# Patient Record
Sex: MALE | Race: WHITE | NOT HISPANIC OR LATINO | Employment: FULL TIME | URBAN - METROPOLITAN AREA
[De-identification: names, ages, dates, MRNs, and addresses within clinical notes are randomized per-mention and may not be internally consistent; named-entity substitution may affect disease eponyms.]

---

## 2020-06-25 ENCOUNTER — OFFICE VISIT (OUTPATIENT)
Dept: FAMILY MEDICINE | Facility: CLINIC | Age: 30
End: 2020-06-25
Payer: COMMERCIAL

## 2020-06-25 ENCOUNTER — ANCILLARY PROCEDURE (OUTPATIENT)
Dept: GENERAL RADIOLOGY | Facility: CLINIC | Age: 30
End: 2020-06-25
Attending: FAMILY MEDICINE
Payer: COMMERCIAL

## 2020-06-25 ENCOUNTER — TELEPHONE (OUTPATIENT)
Dept: FAMILY MEDICINE | Facility: CLINIC | Age: 30
End: 2020-06-25

## 2020-06-25 VITALS
HEART RATE: 73 BPM | WEIGHT: 199 LBS | SYSTOLIC BLOOD PRESSURE: 129 MMHG | DIASTOLIC BLOOD PRESSURE: 83 MMHG | HEIGHT: 70 IN | BODY MASS INDEX: 28.49 KG/M2 | TEMPERATURE: 98 F | OXYGEN SATURATION: 99 %

## 2020-06-25 DIAGNOSIS — J32.0 CHRONIC MAXILLARY SINUSITIS: ICD-10-CM

## 2020-06-25 DIAGNOSIS — J32.0 CHRONIC MAXILLARY SINUSITIS: Primary | ICD-10-CM

## 2020-06-25 DIAGNOSIS — G44.039 NONINTRACTABLE PAROXYSMAL HEMICRANIA, UNSPECIFIED CHRONICITY PATTERN: ICD-10-CM

## 2020-06-25 PROCEDURE — 70220 X-RAY EXAM OF SINUSES: CPT

## 2020-06-25 PROCEDURE — 99204 OFFICE O/P NEW MOD 45 MIN: CPT | Performed by: FAMILY MEDICINE

## 2020-06-25 RX ORDER — FINASTERIDE 5 MG/1
5 TABLET, FILM COATED ORAL
COMMUNITY
Start: 2019-10-28 | End: 2021-03-02

## 2020-06-25 ASSESSMENT — MIFFLIN-ST. JEOR: SCORE: 1876.4

## 2020-06-25 ASSESSMENT — PAIN SCALES - GENERAL: PAINLEVEL: MODERATE PAIN (4)

## 2020-06-25 NOTE — PROGRESS NOTES
"Subjective     Mikhail Sinha is a 29 year old male who presents to clinic today for the following health issues:    HPI   Acute Illness   Acute illness concerns: sinus problem  Onset: 40 days ago    Fever: no    Chills/Sweats: no    Headache (location?): YES- off and on    Sinus Pressure:YES- post-nasal drainage, facial pain and head fog, pressure feeling in the forehead area    Conjunctivitis:  no    Ear Pain: YES: both    Rhinorrhea: no    Congestion: no    Sore Throat: no     Cough: no    Wheeze: no    Decreased Appetite: YES    Nausea: YES    Vomiting: No    Diarrhea:  YES- one night loose stool    Dysuria/Freq.: no    Fatigue/Achiness: YES    Sick/Strep Exposure: no     Therapies Tried and outcome: OTC Claritin and Flonase and to helpful  Did do a virtual online visit and they gave him Augement for 10 days     He has felt head congestion for several weeks. No fever or cough. Intermittent dizziness.  No change I sense of taste or smell.     No improvement with Claritin, Flonase and augmentin    He does not have a hx of sinus infections or allergies.     Took sudafed and felt worse.           Reviewed and updated as needed this visit by Provider         Review of Systems   Constitutional, HEENT, cardiovascular, pulmonary, gi and gu systems are negative, except as otherwise noted.      Objective    /83 (BP Location: Left arm, Patient Position: Chair, Cuff Size: Adult Regular)   Pulse 73   Temp 98  F (36.7  C) (Oral)   Ht 1.79 m (5' 10.47\")   Wt 90.3 kg (199 lb)   SpO2 99%   BMI 28.17 kg/m    Body mass index is 28.17 kg/m .  Physical Exam   GENERAL: healthy, alert and no distress  EYES: Eyes grossly normal to inspection, PERRL and conjunctivae and sclerae normal  HENT: ear canals and TM's normal, nose and mouth without ulcers or lesions, oropharynx clear, oral mucous membranes moist and no sinus tenderness  NECK: no adenopathy, no asymmetry, masses, or scars and thyroid normal to " "palpation  RESP: lungs clear to auscultation - no rales, rhonchi or wheezes  CV: regular rate and rhythm, normal S1 S2, no S3 or S4, no murmur, click or rub, no peripheral edema and peripheral pulses strong  ABDOMEN: soft, nontender, no hepatosplenomegaly, no masses and bowel sounds normal  MS: no gross musculoskeletal defects noted, no edema  NEURO: Normal strength and tone, mentation intact and speech normal  PSYCH: mentation appears normal, affect normal/bright    Diagnostic Test Results:  Labs reviewed in Epic  Sinus x-ray normal        Assessment & Plan   Assessment      Plan  (J32.0) Chronic maxillary sinusitis  (primary encounter diagnosis)  Comment:   Plan: XR Sinus Complete G/E 3 Views          X-rays normal and atypical presentation for sinusitus    (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    Weight as of this encounter: 90.3 kg (199 lb).             Patient Instructions     Orders Placed This Encounter     XR Sinus Complete G/E 3 Views     Standing Status:   Future     Number of Occurrences:   1     Standing Expiration Date:   2021     Order Specific Question:   Priority     Answer:   Routine     MR Brain w/o & w Contrast     Standing Status:   Future     Standing Expiration Date:   2021     Order Specific Question:   Priority     Answer:   Routine     Order Specific Question:   Does the patient have claustrophobia?     Answer:   No     Order Specific Question:   Is this exam to be performed at [facility]?     Answer:   No     finasteride (PROSCAR) 5 MG tablet     Si mg Patient is takng 1.25 mg daily     Scheduling Appointments        [facility]. " All locations.  Call: [phone]        Return in about 1 week (around 7/2/2020) for Dr Anand will call to review results.    Xavi Anand MD, MD  [facility]

## 2020-06-25 NOTE — TELEPHONE ENCOUNTER
"Routing to Dr. Anand as an FYI.  Patient has an appointment scheduled with you today at 2:40 pm for sinus symptoms.      RN from  asked this RN to triage patient.    Patient reports he has had the following sinus symptoms x one month that come and go:  facial/sinus pain, headache, bilateral ear pain (mostly R ear), dry throat, jaw pain, and \"brain fog\".  He rates pain a 5-6/10, and has lots of pressure in his ears/sinus/head. Denies fever, cough, SOB, sore throat, ear drainage, or other symptoms.    Patient reports having a virtual visit two weeks ago at [facility] and was treated with Augmentin for a possible sinus infection. He finished course of antibiotics and symptoms continue.  This encounter is not in his chart. , address, and phone number verified with patient.     He has tried Flonase, Claritin, and Sudafed; however, medications do not seem to help.     Patient will plan on seeing Dr. Anand as scheduled at 2:40 pm.  He will ask the  if there are two different charts under his name.     Jacobo Jefferson RN          "

## 2020-06-25 NOTE — PATIENT INSTRUCTIONS
Orders Placed This Encounter     XR Sinus Complete G/E 3 Views     Standing Status:   Future     Number of Occurrences:   1     Standing Expiration Date:   2021     Order Specific Question:   Priority     Answer:   Routine     MR Brain w/o & w Contrast     Standing Status:   Future     Standing Expiration Date:   2021     Order Specific Question:   Priority     Answer:   Routine     Order Specific Question:   Does the patient have claustrophobia?     Answer:   No     Order Specific Question:   Is this exam to be performed at [facility]?     Answer:   No     finasteride (PROSCAR) 5 MG tablet     Si mg Patient is takng 1.25 mg daily     Scheduling Appointments        [facility]. All locations.  Call: [phone]

## 2020-06-29 ENCOUNTER — HOSPITAL ENCOUNTER (OUTPATIENT)
Dept: MRI IMAGING | Facility: CLINIC | Age: 30
Discharge: HOME | End: 2020-06-29
Attending: FAMILY MEDICINE | Admitting: FAMILY MEDICINE
Payer: COMMERCIAL

## 2020-06-29 DIAGNOSIS — G44.039 NONINTRACTABLE PAROXYSMAL HEMICRANIA, UNSPECIFIED CHRONICITY PATTERN: ICD-10-CM

## 2020-06-29 PROCEDURE — 70551 MRI BRAIN STEM W/O DYE: CPT

## 2020-07-13 ENCOUNTER — MYC MEDICAL ADVICE (OUTPATIENT)
Dept: FAMILY MEDICINE | Facility: CLINIC | Age: 30
End: 2020-07-13

## 2020-07-13 ENCOUNTER — VIRTUAL VISIT (OUTPATIENT)
Dept: FAMILY MEDICINE | Facility: CLINIC | Age: 30
End: 2020-07-13
Payer: COMMERCIAL

## 2020-07-13 DIAGNOSIS — F41.9 ANXIETY: ICD-10-CM

## 2020-07-13 DIAGNOSIS — R68.84 JAW PAIN: Primary | ICD-10-CM

## 2020-07-13 DIAGNOSIS — M26.609 TMJ (TEMPOROMANDIBULAR JOINT SYNDROME): Primary | ICD-10-CM

## 2020-07-13 PROCEDURE — 99214 OFFICE O/P EST MOD 30 MIN: CPT | Mod: 95 | Performed by: FAMILY MEDICINE

## 2020-07-13 RX ORDER — SERTRALINE HYDROCHLORIDE 25 MG/1
25 TABLET, FILM COATED ORAL DAILY
Qty: 60 TABLET | Refills: 3 | Status: SHIPPED | OUTPATIENT
Start: 2020-07-13 | End: 2020-09-01

## 2020-07-13 NOTE — PROGRESS NOTES
"Mikhail Sinha is a 29 year old male who is being evaluated via a billable telephone visit.      The patient has been notified of following:     \"This telephone visit will be conducted via a call between you and your physician/provider. We have found that certain health care needs can be provided without the need for a physical exam.  This service lets us provide the care you need with a short phone conversation.  If a prescription is necessary we can send it directly to your pharmacy.  If lab work is needed we can place an order for that and you can then stop by our lab to have the test done at a later time.    Telephone visits are billed at different rates depending on your insurance coverage. During this emergency period, for some insurers they may be billed the same as an in-person visit.  Please reach out to your insurance provider with any questions.    If during the course of the call the physician/provider feels a telephone visit is not appropriate, you will not be charged for this service.\"    Patient has given verbal consent for Telephone visit?  Yes    What phone number would you like to be contacted at? [phone]    How would you like to obtain your AVS? MyChart    Subjective     Mikhail Sinha is a 29 year old male who presents via phone visit today for the following health issues:    HPI  Concern - Imaging Results  Onset: done at the end of June     Description:   Discuss results of imaging    Reviewed MRI which was normal. He notes continuation of pain but state it may be more around his jaw and go to his ear. I asked him to press pn his TMJ and he noted mild tenderness    He also noted he has started counseling for anxiety and stress. It has been mildly helpful. He as wondering if this could contribute to his symptoms    Sister had anxiety and responded to medications. Unclear what medications she took    Notes he sleeps ok. He was told in in the past that he grinds his wili     He " does exercise regularly. No significant alcholol use. No   recreational drugs.         BP Readings from Last 3 Encounters:   06/25/20 129/83    Wt Readings from Last 3 Encounters:   06/25/20 90.3 kg (199 lb)                    Reviewed and updated as needed this visit by Provider         Review of Systems   Constitutional, HEENT, cardiovascular, pulmonary, gi and gu systems are negative, except as otherwise noted.       Objective   Reported vitals:  There were no vitals taken for this visit.   healthy, alert and no distress  PSYCH: Alert and oriented times 3; coherent speech, normal   rate and volume, able to articulate logical thoughts, able   to abstract reason, no tangential thoughts, no hallucinations   or delusions  His affect is anxious  RESP: No cough, no audible wheezing, able to talk in full sentences  Remainder of exam unable to be completed due to telephone visits    Diagnostic Test Results:  Labs reviewed in Epic        Assessment/Plan:  1. Jaw pain  Possible cause of headaches  - OTOLARYNGOLOGY REFERRAL    2. Anxiety  He is seeing a counselor, we had a long discussion on anxiety, symptoms that it can cause or contribute to and medications that can be helpful. Discussed risks and benefits  - sertraline (ZOLOFT) 25 MG tablet; Take 1 tablet (25 mg) by mouth daily  Dispense: 60 tablet; Refill: 3    Return in 4 weeks (on 8/10/2020) for Review response to medications.      Phone call duration:  15 minutes    Xavi Anand MD,

## 2020-07-14 NOTE — TELEPHONE ENCOUNTER
Need to change order      No orders of the defined types were placed in this encounter.      Orders Placed This Encounter     OTOLARYNGOLOGY REFERRAL     Referral Priority:   Routine     Referral Type:   Consultation     Number of Visits Requested:   1

## 2020-08-13 ENCOUNTER — TELEPHONE (OUTPATIENT)
Dept: FAMILY MEDICINE | Facility: CLINIC | Age: 30
End: 2020-08-13

## 2020-08-13 ENCOUNTER — VIRTUAL VISIT (OUTPATIENT)
Dept: FAMILY MEDICINE | Facility: CLINIC | Age: 30
End: 2020-08-13
Payer: COMMERCIAL

## 2020-08-13 DIAGNOSIS — Z13.6 CARDIOVASCULAR SCREENING; LDL GOAL LESS THAN 160: ICD-10-CM

## 2020-08-13 DIAGNOSIS — L65.9 HAIR LOSS: ICD-10-CM

## 2020-08-13 DIAGNOSIS — F41.9 ANXIETY: Primary | ICD-10-CM

## 2020-08-13 PROCEDURE — 96127 BRIEF EMOTIONAL/BEHAV ASSMT: CPT | Performed by: FAMILY MEDICINE

## 2020-08-13 PROCEDURE — 99213 OFFICE O/P EST LOW 20 MIN: CPT | Mod: 95 | Performed by: FAMILY MEDICINE

## 2020-08-13 ASSESSMENT — ANXIETY QUESTIONNAIRES
3. WORRYING TOO MUCH ABOUT DIFFERENT THINGS: MORE THAN HALF THE DAYS
6. BECOMING EASILY ANNOYED OR IRRITABLE: NOT AT ALL
2. NOT BEING ABLE TO STOP OR CONTROL WORRYING: SEVERAL DAYS
7. FEELING AFRAID AS IF SOMETHING AWFUL MIGHT HAPPEN: NOT AT ALL
IF YOU CHECKED OFF ANY PROBLEMS ON THIS QUESTIONNAIRE, HOW DIFFICULT HAVE THESE PROBLEMS MADE IT FOR YOU TO DO YOUR WORK, TAKE CARE OF THINGS AT HOME, OR GET ALONG WITH OTHER PEOPLE: SOMEWHAT DIFFICULT
GAD7 TOTAL SCORE: 6
1. FEELING NERVOUS, ANXIOUS, OR ON EDGE: MORE THAN HALF THE DAYS
5. BEING SO RESTLESS THAT IT IS HARD TO SIT STILL: NOT AT ALL

## 2020-08-13 ASSESSMENT — PATIENT HEALTH QUESTIONNAIRE - PHQ9: 5. POOR APPETITE OR OVEREATING: SEVERAL DAYS

## 2020-08-13 NOTE — TELEPHONE ENCOUNTER
Left voicemail asking patient to call back and schedule lab appt and follow up.    Thank you,  Darline KHOURY  [facility]  Team Shira Coordinator

## 2020-08-13 NOTE — TELEPHONE ENCOUNTER
Xavi Anand MD  [facility] Team Shira               Needs lab only appointment and a phone follow up with me in 2 months      Copied from CC'd chart

## 2020-08-13 NOTE — PATIENT INSTRUCTIONS
We will call to set up a lab only appointment and a follow up with me in 2 months    You will also get a call from our counseling schedulers. Recommend [facility]    Orders Placed This Encounter     **TSH with free T4 reflex FUTURE anytime     Last Lab Result: No results found for: TSH     Standing Status:   Future     Standing Expiration Date:   8/13/2021     **Basic metabolic panel FUTURE anytime     Standing Status:   Future     Standing Expiration Date:   8/13/2021     Lipid panel reflex to direct LDL Fasting     Last Lab Result: No results found for: LDL     Standing Status:   Future     Standing Expiration Date:   8/13/2021     Order Specific Question:   Perform labs while fasting or non-fasting?     Answer:   Fasting     MENTAL HEALTH REFERRAL  - Adult; Outpatient Treatment; Individual/Couples/Family/Group Therapy/Health Psychology; [facility] [place]: [facility] [phone]; We will contact you to schedule the appointment or please call with any questions     Referral Priority:   Routine     Referral Type:   Mental Health Outpatient     Number of Visits Requested:   1     sertraline (ZOLOFT) 50 MG tablet     Sig: Take 1 tablet (50 mg) by mouth daily     Dispense:  90 tablet     Refill:  3

## 2020-08-13 NOTE — PROGRESS NOTES
"Mikhail Sinha is a 29 year old male who is being evaluated via a billable telephone visit.      The patient has been notified of following:     \"This telephone visit will be conducted via a call between you and your physician/provider. We have found that certain health care needs can be provided without the need for a physical exam.  This service lets us provide the care you need with a short phone conversation.  If a prescription is necessary we can send it directly to your pharmacy.  If lab work is needed we can place an order for that and you can then stop by our lab to have the test done at a later time.    Telephone visits are billed at different rates depending on your insurance coverage. During this emergency period, for some insurers they may be billed the same as an in-person visit.  Please reach out to your insurance provider with any questions.    If during the course of the call the physician/provider feels a telephone visit is not appropriate, you will not be charged for this service.\"    Patient has given verbal consent for Telephone visit?  Yes    What phone number would you like to be contacted at? [phone]    How would you like to obtain your AVS? Dianahart    Subjective     Mikhail Sinha is a 29 year old male who presents via phone visit today for the following health issues:    HPI    Anxiety Follow-Up    How are you doing with your anxiety since your last visit? Improved     Are you having other symptoms that might be associated with anxiety? No    Have you had a significant life event? No     Are you feeling depressed? No    Do you have any concerns with your use of alcohol or other drugs? No    Social History     Tobacco Use     Smoking status: Never Smoker     Smokeless tobacco: Never Used   Substance Use Topics     Alcohol use: Yes     Drug use: Not Currently     Types: Marijuana     Comment: Back in college     No flowsheet data found.  No flowsheet data found.  NICHOLAS-7  " 8/13/2020   1. Feeling nervous, anxious, or on edge 2   2. Not being able to stop or control worrying 1   3. Worrying too much about different things 2   4. Trouble relaxing 1   5. Being so restless that it is hard to sit still 0   6. Becoming easily annoyed or irritable 0   7. Feeling afraid, as if something awful might happen 0   NICHOLAS-7 Total Score 6   If you checked any problems, how difficult have they made it for you to do your work, take care of things at home, or get along with other people? Somewhat difficult         How many servings of fruits and vegetables do you eat daily?  2-3    On average, how many sweetened beverages do you drink each day (Examples: soda, juice, sweet tea, etc.  Do NOT count diet or artificially sweetened beverages)?   1    How many days per week do you exercise enough to make your heart beat faster? 3 or less    How many minutes a day do you exercise enough to make your heart beat faster? 30 - 60    How many days per week do you miss taking your medication? 0         His jaw pain has improved also    He has noted some hair loss recently, eye brows    Recommend some baseline labs and will get fasting labs for screening lipids and blood sugar at the same time         Reviewed and updated as needed this visit by Provider         Review of Systems   Constitutional, HEENT, cardiovascular, pulmonary, gi and gu systems are negative, except as otherwise noted.       Objective          Vitals:  No vitals were obtained today due to virtual visit.    healthy, alert and no distress  PSYCH: Alert and oriented times 3; coherent speech, normal   rate and volume, able to articulate logical thoughts, able   to abstract reason, no tangential thoughts, no hallucinations   or delusions  His affect is normal  RESP: No cough, no audible wheezing, able to talk in full sentences  Remainder of exam unable to be completed due to telephone visits    Diagnostic Test Results:  none      "    Assessment/Plan:    Assessment & Plan   Assessment      Plan  (F41.9) Anxiety  (primary encounter diagnosis)  Comment: improved and tolerated medication, he would like to pursue a new counselor  Plan: sertraline (ZOLOFT) 50 MG tablet, **TSH with         free T4 reflex FUTURE anytime, **Basic         metabolic panel FUTURE anytime, MENTAL HEALTH         REFERRAL  - Adult; Outpatient Treatment;         Individual/Couples/Family/Group Therapy/Health         [place]ology; [facility] [place] [facility]         [phone]; We will contact you to schedule        the appointment or please call with any         questions            (Z13.6) CARDIOVASCULAR SCREENING; LDL GOAL LESS THAN 160  Comment: he will be coming in for fasting labs  Plan: Lipid panel reflex to direct LDL Fasting            (L65.9) Hair loss  Comment:   Plan: **TSH with free T4 reflex FUTURE anytime,         **Basic metabolic panel FUTURE anytime              BMI:   Estimated body mass index is 28.17 kg/m  as calculated from the following:    Height as of 6/25/20: 1.79 m (5' 10.47\").    Weight as of 6/25/20: 90.3 kg (199 lb).             Patient Instructions     We will call to set up a lab only appointment and a follow up with me in 2 months    You will also get a call from our counseling schedulers. Recommend [facility]    Orders Placed This Encounter     **TSH with free T4 reflex FUTURE anytime     Last Lab Result: No results found for: TSH     Standing Status:   Future     Standing Expiration Date:   8/13/2021     **Basic metabolic panel FUTURE anytime     Standing Status:   Future     Standing Expiration Date:   8/13/2021     Lipid panel reflex to direct LDL Fasting     Last Lab Result: No results found for: LDL     Standing Status:   Future     Standing Expiration Date:   8/13/2021     Order Specific Question:   Perform labs while fasting or non-fasting?     Answer:   Fasting     MENTAL HEALTH REFERRAL  - Adult; Outpatient Treatment; " Individual/Couples/Family/Group Therapy/Health Psychology; [facility]: [facility] [phone]; We will contact you to schedule the appointment or please call with any questions     Referral Priority:   Routine     Referral Type:   Mental Health Outpatient     Number of Visits Requested:   1     sertraline (ZOLOFT) 50 MG tablet     Sig: Take 1 tablet (50 mg) by mouth daily     Dispense:  90 tablet     Refill:  3           Return in about 1 month (around 9/13/2020) for Review response to medications.    Xavi Anand MD, MD  [facility]          13 minutes with chart review, lab review and discussion with patient and creating plan

## 2020-08-14 ASSESSMENT — ANXIETY QUESTIONNAIRES: GAD7 TOTAL SCORE: 6

## 2020-08-17 NOTE — TELEPHONE ENCOUNTER
Patient called back and scheduled appointment.    Thank you,  Darline KHOURY  [facility]  Team Shira Coordinator

## 2020-08-27 DIAGNOSIS — F41.9 ANXIETY: ICD-10-CM

## 2020-08-27 DIAGNOSIS — Z13.6 CARDIOVASCULAR SCREENING; LDL GOAL LESS THAN 160: ICD-10-CM

## 2020-08-27 DIAGNOSIS — L65.9 HAIR LOSS: ICD-10-CM

## 2020-08-27 LAB
ANION GAP SERPL CALCULATED.3IONS-SCNC: 2 MMOL/L (ref 3–14)
BUN SERPL-MCNC: 18 MG/DL (ref 7–30)
CALCIUM SERPL-MCNC: 9.1 MG/DL (ref 8.5–10.1)
CHLORIDE SERPL-SCNC: 106 MMOL/L (ref 94–109)
CHOLEST SERPL-MCNC: 199 MG/DL
CO2 SERPL-SCNC: 31 MMOL/L (ref 20–32)
CREAT SERPL-MCNC: 1.01 MG/DL (ref 0.66–1.25)
GFR SERPL CREATININE-BSD FRML MDRD: >90 ML/MIN/{1.73_M2}
GLUCOSE SERPL-MCNC: 97 MG/DL (ref 70–99)
HDLC SERPL-MCNC: 57 MG/DL
LDLC SERPL CALC-MCNC: 126 MG/DL
NONHDLC SERPL-MCNC: 142 MG/DL
POTASSIUM SERPL-SCNC: 4.2 MMOL/L (ref 3.4–5.3)
SODIUM SERPL-SCNC: 139 MMOL/L (ref 133–144)
TRIGL SERPL-MCNC: 79 MG/DL
TSH SERPL DL<=0.005 MIU/L-ACNC: 2.04 MU/L (ref 0.4–4)

## 2020-08-27 PROCEDURE — 36415 COLL VENOUS BLD VENIPUNCTURE: CPT | Performed by: FAMILY MEDICINE

## 2020-08-27 PROCEDURE — 80048 BASIC METABOLIC PNL TOTAL CA: CPT | Performed by: FAMILY MEDICINE

## 2020-08-27 PROCEDURE — 80061 LIPID PANEL: CPT | Performed by: FAMILY MEDICINE

## 2020-08-27 PROCEDURE — 84443 ASSAY THYROID STIM HORMONE: CPT | Performed by: FAMILY MEDICINE

## 2020-10-05 ENCOUNTER — VIRTUAL VISIT (OUTPATIENT)
Dept: BEHAVIORAL HEALTH | Facility: CLINIC | Age: 30
End: 2020-10-05
Attending: FAMILY MEDICINE
Payer: COMMERCIAL

## 2020-10-05 DIAGNOSIS — F41.1 GAD (GENERALIZED ANXIETY DISORDER): Primary | ICD-10-CM

## 2020-10-05 PROCEDURE — 90791 PSYCH DIAGNOSTIC EVALUATION: CPT | Mod: 95 | Performed by: SOCIAL WORKER

## 2020-10-05 ASSESSMENT — COLUMBIA-SUICIDE SEVERITY RATING SCALE - C-SSRS
ATTEMPT PAST THREE MONTHS: NO
1. IN THE PAST MONTH, HAVE YOU WISHED YOU WERE DEAD OR WISHED YOU COULD GO TO SLEEP AND NOT WAKE UP?: NO
TOTAL  NUMBER OF INTERRUPTED ATTEMPTS PAST 3 MONTHS: NO
TOTAL  NUMBER OF INTERRUPTED ATTEMPTS LIFETIME: NO
3. HAVE YOU BEEN THINKING ABOUT HOW YOU MIGHT KILL YOURSELF?: NO
ATTEMPT LIFETIME: NO
2. HAVE YOU ACTUALLY HAD ANY THOUGHTS OF KILLING YOURSELF?: NO
5. HAVE YOU STARTED TO WORK OUT OR WORKED OUT THE DETAILS OF HOW TO KILL YOURSELF? DO YOU INTEND TO CARRY OUT THIS PLAN?: NO
4. HAVE YOU HAD THESE THOUGHTS AND HAD SOME INTENTION OF ACTING ON THEM?: NO
5. HAVE YOU STARTED TO WORK OUT OR WORKED OUT THE DETAILS OF HOW TO KILL YOURSELF? DO YOU INTEND TO CARRY OUT THIS PLAN?: NO
4. HAVE YOU HAD THESE THOUGHTS AND HAD SOME INTENTION OF ACTING ON THEM?: NO
1. IN THE PAST MONTH, HAVE YOU WISHED YOU WERE DEAD OR WISHED YOU COULD GO TO SLEEP AND NOT WAKE UP?: NO
2. HAVE YOU ACTUALLY HAD ANY THOUGHTS OF KILLING YOURSELF LIFETIME?: NO

## 2020-10-05 ASSESSMENT — ANXIETY QUESTIONNAIRES
4. TROUBLE RELAXING: SEVERAL DAYS
7. FEELING AFRAID AS IF SOMETHING AWFUL MIGHT HAPPEN: SEVERAL DAYS
6. BECOMING EASILY ANNOYED OR IRRITABLE: SEVERAL DAYS
3. WORRYING TOO MUCH ABOUT DIFFERENT THINGS: MORE THAN HALF THE DAYS
GAD7 TOTAL SCORE: 12
GAD7 TOTAL SCORE: 12
7. FEELING AFRAID AS IF SOMETHING AWFUL MIGHT HAPPEN: SEVERAL DAYS
GAD7 TOTAL SCORE: 12
1. FEELING NERVOUS, ANXIOUS, OR ON EDGE: NEARLY EVERY DAY
2. NOT BEING ABLE TO STOP OR CONTROL WORRYING: NEARLY EVERY DAY
5. BEING SO RESTLESS THAT IT IS HARD TO SIT STILL: SEVERAL DAYS

## 2020-10-05 ASSESSMENT — PATIENT HEALTH QUESTIONNAIRE - PHQ9
10. IF YOU CHECKED OFF ANY PROBLEMS, HOW DIFFICULT HAVE THESE PROBLEMS MADE IT FOR YOU TO DO YOUR WORK, TAKE CARE OF THINGS AT HOME, OR GET ALONG WITH OTHER PEOPLE: SOMEWHAT DIFFICULT
SUM OF ALL RESPONSES TO PHQ QUESTIONS 1-9: 11
SUM OF ALL RESPONSES TO PHQ QUESTIONS 1-9: 11

## 2020-10-05 NOTE — PROGRESS NOTES
"        Child and Adolescent Day Treatment  Evaluator Name:  Amaury Shore     [facility]    PATIENT'S NAME: Mikhail Sinha  PREFERRED NAME: Marshall  PRONOUNS:       MRN:   [id number]  :   1990   ACCT. NUMBER: [id number]  DATE OF SERVICE: 10/05/20  START TIME: 1130  END TIME: 1215  PREFERRED PHONE: [phone]  May we leave a program related message: Yes  SERVICE MODALITY:  Video Visit:    Telemedicine Visit: The patient's condition can be safely assessed and treated via synchronous audio and visual telemedicine encounter.      Reason for Telemedicine Visit: Patient has requested telehealth visit    Originating Site (Patient Location): Patient's home    Distant Site (Provider Location): [facility]    Consent:  The patient/guardian has verbally consented to: the potential risks and benefits of telemedicine (video visit) versus in person care; bill my insurance or make self-payment for services provided; and responsibility for payment of non-covered services.     Patient would like the video invitation sent by: Send to e-mail at: [email or web address]    Mode of Communication:  Video Conference via [facility]    As the provider I attest to compliance with applicable laws and regulations related to telemedicine.    UNIVERSAL ADULT DIAGNOSTIC ASSESSMENT      Identifying Information:  Patient is a 30 year old, .  The pronoun use throughout this assessment reflects the patient's chosen pronoun.  Patient was referred for an assessment by self.  Patient attended the session alone.     Chief Complaint:   The reason for seeking services at this time is: \" anxiety \"   The problem(s) began 2020.  Only minor anxiety before.   Patient has attempted to resolve these concerns in the past through meds from pcp.  Had a panic attack and then could not get anxious thoughts out of his mind.  Rxd Zoloft and feels much better, not " "sure if it is the meds or him adapting to his anxiety.    Social/Family History:  Patient reported they grew up in [place], [place].  They were raised by biological parents.  Two brothers and one sister, he is the youngest.  Parents stayed .   Patient reported that their childhood was \"good.\"  Patient described their current relationships with family of origin as \"good\".      The patient describes their cultural background as \"no strong connection to culture.\"  These factors will be addressed in the Preliminary Treatment plan.  Patient identified their preferred language to be English. Patient reported they does not need the assistance of an  or other support involved in therapy.     Patient reported had no significant delays in developmental tasks.   Patient's highest education level was college graduate. Patient identified the following learning problems: none reported.  Modifications will not be used to assist communication in therapy.   Patient reports they are  able to understand written materials.    Patient reported the following relationship history.  Patient's current relationship status is partnered / significant other for almost 5 years.  Reports relationship is good.  Girlfriend gets some anxiety so she understands how things have been for the past months.  Patient identified their sexual orientation as heterosexual.  Patient reported having zero child(evita). Patient identified partner, parents, siblings and friends as part of their support system.  Patient identified the quality of these relationships as good.      Patient's current living/housing situation involves staying in own home/apartment.  They live with girfriend and they report that housing is stable.     Patient is currently employed full time and reports they are able to function appropriately at work.  Works as a  for [employer], likes his work and his coworkers.  Working from home can make it boring.  " Patient reports their finances are obtained through employment.  Patient does not identify finances as a current stressor.      Patient reported that they have not been involved with the legal system.   Patient does report being on probation / parole / under the jurisdiction of the court: na.    Patient's Strengths and Limitations:  Patient identified the following strengths or resources that will help them succeed in treatment: friends / good social support, family support, insight, intelligence, positive work environment, motivation, sense of humor, strong social skills and work ethic. Things that may interfere with the patient's success in treatment include: none identified.     Personal and Family Medical History:   Patient does report a family history of mental health concerns, mom is excessive worrier.  Patient reports family history is not on file..     Patient does not report Mental Health Diagnosis or Treatment.      Patient has had a physical exam to rule out medical causes for current symptoms.  Date of last physical exam was within the past year. Client was encouraged to follow up with PCP if symptoms were to develop. The patient has a [facility] Primary Care Provider, who is named No Ref-Primary, Physician..  Patient reports the following current medical concerns: per EMR.  There are not significant appetite / nutritional concerns / weight changes.   Patient does not report a history of head injury / trauma / cognitive impairment.      Patient reports current meds as:   No outpatient medications have been marked as taking for the 10/5/20 encounter (Virtual Visit) with Josesito Singh LICSW.       Medication Adherence:  Patient reports taking prescribed medications as prescribed.    Patient Allergies:  No Known Allergies    Medical History:  No past medical history on file.      Current Mental Status Exam:   Appearance:  Appropriate    Eye Contact:  Good   Psychomotor:  Normal       Gait /  station:  no problem  Attitude / Demeanor: Cooperative   Speech      Rate / Production: Normal/ Responsive      Volume:  Normal  volume      Language:  intact  Mood:   Anxious   Affect:   Appropriate    Thought Content: Clear   Thought Process: Blocking       Associations: No loosening of associations  Insight:   Good   Judgment:  Intact   Orientation:  All  Attention/concentration: Good    Rating Scales:    PHQ9:    PHQ-9 SCORE 10/5/2020   PHQ-9 Total Score MyChart 11 (Moderate depression)   PHQ-9 Total Score 11   ;    GAD7:    NICHOLAS-7 SCORE 8/13/2020 10/5/2020   Total Score - 12 (moderate anxiety)   Total Score 6 12   Answers for HPI/ROS submitted by the patient on 10/5/2020   If you checked off any problems, how difficult have these problems made it for you to do your work, take care of things at home, or get along with other people?: Somewhat difficult    CGI:     First:Considering your total clinical experience with this particular patient population, how severe are the patient's symptoms at this time?: 4 (10/5/2020 11:00 AM)  ;    Most recentCompared to the patient's condition at the START of treatment, this patient's condition is: 4 (10/5/2020 11:00 AM)      Substance Use:  Patient did not report a family history of substance use concerns; see medical history section for details.  Patient has not received chemical dependency treatment in the past.  Patient has not ever been to detox.      Patient is not currently receiving any chemical dependency treatment. Patient reported the following problems as a result of their substance use: na.    Patient denies using alcohol.  Patient denies using tobacco.  Patient denies using marijuana.  Patient denies using caffeine.  Patient reports using/abusing the following substance(s). Patient reported no other substance use.     CAGE- AID:  No flowsheet data found.    Substance Use: No symptoms    Based on the negative CAGE score and clinical interview there  are not  indications of drug or alcohol abuse.      Significant Losses / Trauma / Abuse / Neglect Issues:   Patient did not serve in the .  There are indications or report of significant loss, trauma, abuse or neglect issues related to: are no indications and client denies any losses, trauma, abuse, or neglect concerns.  Concerns for possible neglect are not present.     Safety Assessment:   Current Safety Concerns:  [place] Suicide Severity Rating Scale (Lifetime/Recent)  [place] Suicide Severity Rating (Lifetime/Recent) 10/5/2020   1. Wish to be Dead (Lifetime) No   1. Wish to be Dead (Recent) No   2. Non-Specific Active Suicidal Thoughts (Lifetime) No   2. Non-Specific Active Suicidal Thoughts (Recent) No   3. Active Suicidal Ideation with any Methods (Not Plan) Without Intent to Act (Lifetime) No   3. Active Sucidal Ideation with any Methods (Not Plan) Without Intent to Act (Recent) No   4. Active Suicidal Ideation with Some Intent to Act, Without Specific Plan (Lifetime) No   4. Active Suicidal Ideation with Some Intent to Act, Without Specific Plan (Recent) No   5. Active Suicidal Ideation with Specific Plan and Intent (Lifetime) No   5. Active Suicidal Ideation with Specific Plan and Intent (Recent) No   Most Severe Ideation Rating (Lifetime) NA   Most Severe Ideation Description (Lifetime) n   Frequency (Lifetime) NA   Duration (Lifetime) NA   Controllability (Lifetime) NA   Protective Factors  (Lifetime) NA   Reasons for Ideation (Lifetime) NA   Most Severe Ideation Rating (Past Month) NA   Actual Attempt (Lifetime) No   Actual Attempt (Past 3 Months) No   Has subject engaged in non-suicidal self-injurious behavior? (Lifetime) No   Has subject engaged in non-suicidal self-injurious behavior? (Past 3 Months) No   Interrupted Attempts (Lifetime) No   Interrupted Attempts (Past 3 Months) No     Patient denies current homicidal ideation and behaviors.  Patient denies current self-injurious ideation and  behaviors.    Patient denied risk behaviors associated with substance use.  Patient denies any high risk behaviors associated with mental health symptoms.  Patient reports the following current concerns for their personal safety: None.  Patient reports there are not  firearms in the house. na.     History of Safety Concerns:  Patient denied a history of homicidal ideation.     Patient denied a history of personal safety concerns.    Patient denied a history of assaultive behaviors.    Patient denied a history of sexual assault behaviors.     Patient denied a history of risk behaviors associated with substance use.  Patient denies any history of high risk behaviors associated with mental health symptoms.  Patient reports the following protective factors: dedication to family/friends, safe and stable environment, regular sleep, effectively controls impulses, secure attachment, adherence with prescribed medication, living with other people, daily obligations, structured day, uses community crisis resources, effective problem-solving skills, committment to well-being, sense of meaning, positive social skills, healthy fear of risky behaviors or pain, financial stability, strong sense of self-worth/esteem, sense of personal control or determination and access to a variety of clinical interventions    Risk Plan:  See Recommendations for Safety and Risk Management Plan    Review of Symptoms per patient report:  Depression: PHQ9 TOTAL SCORE: 11  Sujata:  No Symptoms  Psychosis: No Symptoms  Anxiety: NICHOLAS 7 TOTAL SCORE: 12  Panic:  not currently  Post Traumatic Stress Disorder:  No Symptoms   Eating Disorder: No Symptoms  ADD / ADHD:  No symptoms  Conduct Disorder: No symptoms  Autism Spectrum Disorder: No symptoms  Obsessive Compulsive Disorder: thoughts that he has a hard time getting rid of    Patient reports the following compulsive behaviors and treatment history: n/a.      Diagnostic Criteria:   A. Excessive anxiety and  worry about a number of events or activities (such as work or school performance).   B. The person finds it difficult to control the worry.  C. Select 3 or more symptoms (required for diagnosis). Only one item is required in children.   - Restlessness or feeling keyed up or on edge.    - Being easily fatigued.    - Difficulty concentrating or mind going blank.    - Irritability.    - Muscle tension.    - Sleep disturbance (difficulty falling or staying asleep, or restless unsatisfying sleep).   D. The focus of the anxiety and worry is not confined to features of an Axis I disorder.  E. The anxiety, worry, or physical symptoms cause clinically significant distress or impairment in social, occupational, or other important areas of functioning.   F. The disturbance is not due to the direct physiological effects of a substance (e.g., a drug of abuse, a medication) or a general medical condition (e.g., hyperthyroidism) and does not occur exclusively during a Mood Disorder, a Psychotic Disorder, or a Pervasive Developmental Disorder.    - The aformentioned symptoms began 6 month(s) ago and occurs 6 days per week and is experienced as moderate.    Functional Status:  Patient reports the following functional impairments: relationship(s) and work / vocational responsibilities.     WHODAS: No flowsheet data found.    Clinical Summary:  1. Reason for assessment: referred by pcp for anxiety  .  2. Psychosocial, Cultural and Contextual Factors: educated, employed, living with supportive girlfriend, close to family and friends .  3. Principal DSM5 Diagnoses  (Sustained by DSM5 Criteria Listed Above):   300.02 (F41.1) Generalized Anxiety Disorder.  4. Other Diagnoses that is relevant to services:     5. Provisional Diagnosis:    6. Prognosis: Return to Normal Functioning.  7. Likely consequences of symptoms if not treated: worsening anxiety, return of panic attacks.  8. Client strengths include:  caring, creative, educated,  empathetic, employed, goal-focused, good listener, insightful, intelligent, motivated, open to learning, open to suggestions / feedback and support of family, friends and providers .     Recommendations:     1. Plan for Safety and Risk Management:   Recommended that patient call 911 or go to the local ED should there be a change in any of these risk factors..          Report to child / adult protection services was NA.     2. Patient's identified na.     3. Initial Treatment will focus on:    Anxiety - -.     4. Resources/Service Plan:    services are not indicated.   Modifications to assist communication are not indicated.   Additional disability accommodations are not indicated.      5. Collaboration:   Collaboration / coordination of treatment will be initiated with the following  support professionals: primary care physician.      6.  Referrals:   The following referral(s) will be initiated: none. Next Scheduled Appointment: na.     A Release of Information has been obtained for the following: na.    7. VERA:    VERA:  Discussed the general effects of drugs and alcohol on health and well-being. Provider gave patient printed information about the effects of chemical use on their health and well being. Recommendations:  na .     8. Records:    were not available for review at time of assessment.   Information in this assessment was obtained from the medical record and  provided by patient who is a good historian.    Patient will have open access to their mental health medical record.      Eval type:  Mental Health    Provider Name/ Credentials:  ROXANNA Ballard  October 5, 2020

## 2020-10-06 ASSESSMENT — PATIENT HEALTH QUESTIONNAIRE - PHQ9: SUM OF ALL RESPONSES TO PHQ QUESTIONS 1-9: 11

## 2020-10-06 ASSESSMENT — ANXIETY QUESTIONNAIRES: GAD7 TOTAL SCORE: 12

## 2020-10-19 ENCOUNTER — VIRTUAL VISIT (OUTPATIENT)
Dept: BEHAVIORAL HEALTH | Facility: CLINIC | Age: 30
End: 2020-10-19
Payer: COMMERCIAL

## 2020-10-19 DIAGNOSIS — F41.1 GAD (GENERALIZED ANXIETY DISORDER): Primary | ICD-10-CM

## 2020-10-19 PROCEDURE — 90834 PSYTX W PT 45 MINUTES: CPT | Mod: 95 | Performed by: SOCIAL WORKER

## 2020-10-19 NOTE — PROGRESS NOTES
Progress Note    Patient Name: Mikhail Sinha  Date: 10/19/2020          Service Type: Individual      Session Start Time: 730  Session End Time: 815     Session Length: 38-52    Session #: 2    Attendees: Client attended alone    Service Modality:  Video Visit:    Telemedicine Visit: The patient's condition can be safely assessed and treated via synchronous audio and visual telemedicine encounter.      Reason for Telemedicine Visit: Patient has requested telehealth visit    Originating Site (Patient Location): Patient's home    Distant Site (Provider Location): Provider Remote Setting    Consent:  The patient/guardian has verbally consented to: the potential risks and benefits of telemedicine (video visit) versus in person care; bill my insurance or make self-payment for services provided; and responsibility for payment of non-covered services.     Patient would like the video invitation sent by: Text to cell phone: -    Mode of Communication:  Video Conference via [employer]    As the provider I attest to compliance with applicable laws and regulations related to telemedicine.     Treatment Plan Last Reviewed: 10/19/2020   PHQ-9 / NICHOLAS-7 :     DATA  Interactive Complexity: No  Crisis: No       Progress Since Last Session (Related to Symptoms / Goals / Homework):   Symptoms: Improving -    Homework: Partially completed      Episode of Care Goals: Satisfactory progress - ACTION (Actively working towards change); Intervened by reinforcing change plan / affirming steps taken     Current / Ongoing Stressors and Concerns:   Client bought when panic attacks and is reading it.  About 1/3 of the way through so far and is enjoying the section on cogntive therapy techniques.  Has been dealing with what sounds like obsessive worry about his health lately.       Treatment Objective(s) Addressed in This Session:       Goal- Anxiety: Client will decrease anxiety    I will know I've  met my goal when I am less anxious.      Objective #A (Client Action)    Client will use cognitive strategies identified in therapy to challenge anxious thoughts.    Status: New - Date: 10/19/2020    Intervention(s)  Therapist will provide psychoeducation, behavioral activation, and cognitive restructuring.    Objective #B  Client will use at least 3 coping skills for anxiety management in the next 12 weeks.    Status: New - Date: 10/19/2020    Intervention(s)  Therapist will provide psychoeducation, behavioral activation, and cognitive restructuring.      Objective #C  Client will identify three distraction and diversion activities and use those activities to decrease level of anxiety.  Status: New - Date: 10/19/2020    Intervention(s)  Therapist will provide psychoeducation, behavioral activation, and cognitive restructuring.       Intervention:   CBT: - Discussed cognitive restructuring and behavioral activation.  Explored the connection between thoughts, feelings, and actions by using examples relative to client's presenting concerns.  Explained major domains of symptoms (cognitive, emotional, somatic, behavioral, interpersonal) and importance of targeted and specific interventions to reduce symptoms of anxiety and depression.  Discussed role of symptom monitoring in cognitive behavioral treatment.          ASSESSMENT: Current Emotional / Mental Status (status of significant symptoms):   Risk status (Self / Other harm or suicidal ideation)   Patient denies current fears or concerns for personal safety.   Patient denies current or recent suicidal ideation or behaviors.   Patient denies current or recent homicidal ideation or behaviors.   Patient denies current or recent self injurious behavior or ideation.   Patient denies other safety concerns.   Patient reports there has been no change in risk factors since their last session.     Patient reports there has been no change in protective factors since their last  session.     Recommended that patient call 911 or go to the local ED should there be a change in any of these risk factors.     Appearance:   Appropriate    Eye Contact:   Good    Psychomotor Behavior: Normal    Attitude:   Cooperative    Orientation:   All   Speech    Rate / Production: Normal/ Responsive    Volume:  Normal    Mood:    Anxious    Affect:    Appropriate    Thought Content:  Clear    Thought Form:  Coherent  Logical    Insight:    Good      Medication Review:   No changes to current psychiatric medication(s)     Medication Compliance:   Yes     Changes in Health Issues:   None reported     Chemical Use Review:   Substance Use: Chemical use reviewed, no active concerns identified      Tobacco Use: No current tobacco use.      Diagnosis:  1. NICHOLAS (generalized anxiety disorder)        Collateral Reports Completed:   Not Applicable    PLAN: (Patient Tasks / Therapist Tasks / Other)  1.  Keep reading When Panic Attacks  2.  Complete daily mood logs and bring to next session        ROXANNA Dugan                                                         ______________________________________________________________________    Treatment Plan    Patient's Name: Mikhail Sinha  YOB: 1990    Date: 10/19/2020      DSM5 Diagnoses: Generalized Anxiety Disorder    Referral / Collaboration:  Referral to another professional/service is not indicated at this time..    Anticipated number of session or this episode of care: 18-24      MeasurableTreatment Goal(s) related to diagnosis / functional impairment(s)      Goal- Anxiety: Client will decrease anxiety    I will know I've met my goal when I am less anxious.      Objective #A (Client Action)    Client will use cognitive strategies identified in therapy to challenge anxious thoughts.    Status: New - Date: 10/19/2020    Intervention(s)  Therapist will provide psychoeducation, behavioral activation, and cognitive  restructuring.    Objective #B  Client will use at least 3 coping skills for anxiety management in the next 12 weeks.    Status: New - Date: 10/19/2020    Intervention(s)  Therapist will provide psychoeducation, behavioral activation, and cognitive restructuring.      Objective #C  Client will identify three distraction and diversion activities and use those activities to decrease level of anxiety.  Status: New - Date: 10/19/2020    Intervention(s)  Therapist will provide psychoeducation, behavioral activation, and cognitive restructuring.        Patient has reviewed and agreed to the above plan.      Mone Rebolledo, Northern Light Blue [facility] HospitalSW  October 19, 2020

## 2020-11-08 ENCOUNTER — HEALTH MAINTENANCE LETTER (OUTPATIENT)
Age: 30
End: 2020-11-08

## 2020-11-09 ENCOUNTER — VIRTUAL VISIT (OUTPATIENT)
Dept: BEHAVIORAL HEALTH | Facility: CLINIC | Age: 30
End: 2020-11-09
Payer: COMMERCIAL

## 2020-11-09 DIAGNOSIS — Z53.9 ERRONEOUS ENCOUNTER--DISREGARD: Primary | ICD-10-CM

## 2020-11-19 ENCOUNTER — E-VISIT (OUTPATIENT)
Dept: URGENT CARE | Facility: URGENT CARE | Age: 30
End: 2020-11-19
Payer: COMMERCIAL

## 2020-11-19 DIAGNOSIS — Z20.822 SUSPECTED COVID-19 VIRUS INFECTION: Primary | ICD-10-CM

## 2020-11-19 PROCEDURE — 99207 PR NO BILLABLE SERVICE THIS VISIT: CPT | Performed by: PHYSICIAN ASSISTANT

## 2020-11-19 NOTE — PATIENT INSTRUCTIONS
Dear Mikhail Sinha,    Your symptoms show that you may have coronavirus (COVID-19). This illness can cause fever, cough and trouble breathing. Many people get a mild case and get better on their own. Some people can get very sick.    Will I be tested for COVID-19?  We would like to test you for Covid-19 virus. I have placed orders for this test.   To schedule: go to your [employer] home page and scroll down to the section that says  You have an appointment that needs to be scheduled  and click the large green button that says  Schedule Now  and follow the steps to find the next available openings.    If you are unable to complete these [employer] scheduling steps, please call [phone] to schedule your testing.     When it's time for your COVID test:  Stay at least 6 feet away from others. (If someone will drive you to your test, stay in the backseat, as far away from the  as you can.)  Cover your mouth and nose with a mask, tissue or washcloth.  Go straight to the testing site. Don't make any stops on the way there or back.    Starting now:     Do not go to work. Follow your usual processes for taking time away from work.  o If you receive a negative COVID-19 test result and were NOT exposed to someone with a known positive COVID-19 test, you can return to work once you're free of fever for 24 hours without fever-reducing medication and your symptoms are improving or resolved.  o If you receive a positive COVID-19 test result, return to work should be at least 10 days from symptom onset (20 days if people have immune compromise) and people should be fever-free for 24 hours without medications, and the respiratory symptoms should be improved significantly before returning to work or school  o If you were exposed to someone who has tested positive for COVID-19, you can return to work 14 days after your last contact with the positive individual, provided you do not have symptoms at all during that  "time.    During this time, don't leave the house except for testing or medical care.  o Stay in your own room, even for meals. Use your own bathroom if you can.  o Stay away from others in your home. No hugging, kissing or shaking hands. No visitors.  o Don't go to work, school or anywhere else.    Clean \"high touch\" surfaces often (doorknobs, counters, handles, etc.). Use a household cleaning spray or wipes. You'll find a full list of  on the EPA website: www.epa.gov/pesticide-registration/list-n-disinfectants-use-against-sars-cov-2.    Cover your mouth and nose with a mask, tissue or washcloth to avoid spreading germs.    Wash your hands and face often. Use soap and water.    People in these groups are at risk for severe illness due to COVID-19:  o People 65 years and older  o People who live in a nursing home or long-term care facility  o People with chronic disease (lung, heart, cancer, diabetes, kidney, liver, immunologic)  o People who have a weakened immune system, including those who:  - Are in cancer treatment  - Take medicine that weakens the immune system, such as corticosteroids  - Had a bone marrow or organ transplant  - Have an immune deficiency  - Have poorly controlled HIV or AIDS  - Are obese (body mass index of 40 or higher)  - Smoke regularly      Caregivers should wear gloves while washing dishes, handling laundry and cleaning bedrooms and bathrooms.    Use caution when washing and drying laundry: Don't shake dirty laundry, and use the warmest water setting that you can.    For more tips, go to www.cdc.gov/coronavirus/2019-ncov/downloads/10Things.pdf.    Sign up for [employer]. We know it's scary to hear that you might have COVID-19. We want to track your symptoms to make sure you're okay over the next 2 weeks. Please look for an email from [employer]--this is a free, online program that we'll use to keep in touch. To sign up, follow the link in the email you will receive. Learn more " at [email or web address]    How can I take care of myself?    Get lots of rest. Drink extra fluids (unless a doctor has told you not to)    Take Tylenol (acetaminophen) for fever or pain. If you have liver or kidney problems, ask your family doctor if it's okay to take Tylenol.  Adults can take either:    650 mg (two 325 mg pills) every 4 to 6 hours, or     1,000 mg (two 500 mg pills) every 8 hours as needed.    Note: Don't take more than 3,000 mg in one day. Acetaminophen is found in many medicines (both prescribed and over-the-counter medicines). Read all labels to be sure you don't take too much.  For children, check the Tylenol bottle for the right dose. The dose is based on the child's age or weight.    If you have other health problems (like cancer, heart failure, an organ transplant or severe kidney disease): Call your specialty clinic if you don't feel better in the next 2 days.    Know when to call 911. Emergency warning signs include:  Trouble breathing or shortness of breath  Pain or pressure in the chest that doesn't go away  Feeling confused like you haven't felt before, or not being able to wake up  Bluish-colored lips or face    Where can I get more information?    [facility] - About COVID-19: [email or web address]  CDC - What to Do If You're Sick: www.cdc.gov/coronavirus/2019-ncov/about/steps-when-sick.html    November 19, 2020    RE:  Mikhail Sinha                                                                                                                                                       [address]   [facility] [id number]        To whom it may concern:    I evaluated Mikhail Sinha on November 19, 2020. Mikhail Sinha should be excused from work/school.     Return to work should be at least 10 days from when symptoms first started (20 days if immunocompromised) and people should be fever-free for 24 hours without  medications, and the respiratory symptoms should be improved significantly before returning to work or school.       Sincerely,  Sree Payne PA-C

## 2020-11-25 ENCOUNTER — NURSE TRIAGE (OUTPATIENT)
Dept: CARE COORDINATION | Facility: CLINIC | Age: 30
End: 2020-11-25

## 2020-11-25 NOTE — TELEPHONE ENCOUNTER
"Received notification of patient's report of worsening breathing via [employer]. Patient reports that he is doing \"OK\". Feels better now that first thing this morning. He has occasional tightness in his chest which he feels is from coughing. His cough started productive in early course of illness but he feels now it is a little more dry. He feels that once he is up and has taken in fluids, etc that he is improved. He reports anxiety regarding his symptoms and also length of illness. He was diagnosed with COVID-19 11-. Reports not sleeping well the past few nights due to cough but acknowledges that anxiety could also be a factor. Will send information to patient via [employer] regarding sleep hygiene, management of anxiety and cough, worrisome symptoms that require urgent medical evaluation as well as 24/7 nurse triage phone number should he need help after hours. He agrees with plan and has no further questions or concerns.     Answer Assessment - Initial Assessment Questions  1. RESPIRATORY STATUS: \"Describe your breathing?\" (e.g., wheezing, shortness of breath, unable to speak, severe coughing)       Tightness of chest this morning he thinks due to cough and/or anxiety  2. ONSET: \"When did this breathing problem begin?\"       This morning. I had symptoms starting 11/10. Cough started about 6 days ago. Cough is now drier  3. PATTERN \"Does the difficult breathing come and go, or has it been constant since it started?\"       Comes and goes  4. SEVERITY: \"How bad is your breathing?\" (e.g., mild, moderate, severe)     - MILD: No SOB at rest, mild SOB with walking, speaks normally in sentences, can lay down, no retractions, pulse < 100.     - MODERATE: SOB at rest, SOB with minimal exertion and prefers to sit, cannot lie down flat, speaks in phrases, mild retractions, audible wheezing, pulse 100-120.     - SEVERE: Very SOB at rest, speaks in single words, struggling to breathe, sitting hunched forward, " "retractions, pulse > 120       mild  5. RECURRENT SYMPTOM: \"Have you had difficulty breathing before?\" If so, ask: \"When was the last time?\" and \"What happened that time?\"       no  6. CARDIAC HISTORY: \"Do you have any history of heart disease?\" (e.g., heart attack, angina, bypass surgery, angioplasty)       no  7. LUNG HISTORY: \"Do you have any history of lung disease?\"  (e.g., pulmonary embolus, asthma, emphysema)      no  8. CAUSE: \"What do you think is causing the breathing problem?\"       COVID-19  9. OTHER SYMPTOMS: \"Do you have any other symptoms? (e.g., dizziness, runny nose, cough, chest pain, fever)      Slight fatigue  10. PREGNANCY: \"Is there any chance you are pregnant?\" \"When was your last menstrual period?\"        no  11. TRAVEL: \"Have you traveled out of the country in the last month?\" (e.g., travel history, exposures)        no    Protocols used: BREATHING DIFFICULTY-A-OH      "

## 2021-01-29 ENCOUNTER — MYC MEDICAL ADVICE (OUTPATIENT)
Dept: FAMILY MEDICINE | Facility: CLINIC | Age: 31
End: 2021-01-29

## 2021-01-29 NOTE — TELEPHONE ENCOUNTER
RN replied to patient via [employer]. See message for details.     Matt Villegas RN, BSN, PHN  [facility]: [place]

## 2021-02-08 ENCOUNTER — APPOINTMENT (OUTPATIENT)
Dept: URGENT CARE | Facility: CLINIC | Age: 31
End: 2021-02-08
Payer: COMMERCIAL

## 2021-02-11 ENCOUNTER — E-VISIT (OUTPATIENT)
Dept: FAMILY MEDICINE | Facility: CLINIC | Age: 31
End: 2021-02-11
Payer: COMMERCIAL

## 2021-02-11 DIAGNOSIS — L65.9 HAIR LOSS: Primary | ICD-10-CM

## 2021-02-11 PROCEDURE — 99207 PR NON-BILLABLE SERV PER CHARTING: CPT | Performed by: NURSE PRACTITIONER

## 2021-03-02 ENCOUNTER — VIRTUAL VISIT (OUTPATIENT)
Dept: FAMILY MEDICINE | Facility: CLINIC | Age: 31
End: 2021-03-02
Payer: COMMERCIAL

## 2021-03-02 DIAGNOSIS — R09.81 NASAL CONGESTION: ICD-10-CM

## 2021-03-02 DIAGNOSIS — L64.9 MALE PATTERN BALDNESS: ICD-10-CM

## 2021-03-02 DIAGNOSIS — F41.9 ANXIETY: ICD-10-CM

## 2021-03-02 DIAGNOSIS — L65.9 HAIR LOSS: Primary | ICD-10-CM

## 2021-03-02 DIAGNOSIS — G44.039 NONINTRACTABLE PAROXYSMAL HEMICRANIA, UNSPECIFIED CHRONICITY PATTERN: ICD-10-CM

## 2021-03-02 PROCEDURE — 99214 OFFICE O/P EST MOD 30 MIN: CPT | Mod: 95 | Performed by: FAMILY MEDICINE

## 2021-03-02 RX ORDER — FINASTERIDE 5 MG/1
TABLET, FILM COATED ORAL
Qty: 30 TABLET | Refills: 3 | Status: SHIPPED | OUTPATIENT
Start: 2021-03-02

## 2021-03-02 NOTE — PROGRESS NOTES
"Marshall is a 30 year old who is being evaluated via a billable video visit.      How would you like to obtain your AVS? MyChart  If the video visit is dropped, the invitation should be resent by: Text to cell phone: [phone] (M)   Will anyone else be joining your video visit? No     Video Start Time: 8:02 AM    Assessment & Plan     Hair loss  Lateral eyebrow hair loss bilaterally, new for a few months without general hair loss. ?secondary to dermatitis (some itching and flaking, but difficult to assess on video today)   Schedule with dermatology. Referral provided     Nasal congestion  Persistent right sided nasal congestion. Not responsive to flonase trials approx 3-4 weeks x 2 (and he had a very dry nose with some epistaxis on flonase). Partially responsive to antihistamine and neti pot. Associated with some right sided headaches and sometimes right ear pain. Sinus x-rays normal-reviewed results.   Recommended scheduling with ENT for further eval. Referral provided.     Nonintractable paroxysmal hemicrania, unspecified chronicity pattern  MRI brain was normal. Reviewed results. Associated with right sinus symptoms. See plan above.     Anxiety  Improved. Noticed the headaches and sinus issues when the anxiety started to increase, but his anxiety is better and the above symptoms persist. No longer feels they are associated with anxiety.     Male pattern baldness  Was started on finasteride 1.25mg daily by dermatology two years ago. Ran out a month ago and would like a refill. He did feel it was helping.        33 minutes spent on the date of the encounter doing chart review, history and exam, documentation and further activities as noted above     BMI:   Estimated body mass index is 28.17 kg/m  as calculated from the following:    Height as of 6/25/20: 1.79 m (5' 10.47\").    Weight as of 6/25/20: 90.3 kg (199 lb).       Patient Instructions   1) I refilled the finasteride 5mg  for 1/4 tablet daily   2) Schedule " with dermatology to assess your eyebrow hair loss  3) Schedule with ENT for your persistent sinus symptoms      No follow-ups on file.    Cecile Martinez MD   [facility]    Zuly Olivares is a 30 year old who presents for the following health issues     HPI     Renewal of Finasteride  Eyebrow hair loss: started to happen in September, the outer half of eyebrow and really dry, just puts moisturizer   Nasal/Sinus Issues: since June 2020, the right side of the face, headache, some ear pain, sometimes unable to breath out of right side of nasal             Review of Systems          Objective           Vitals:  No vitals were obtained today due to virtual visit.    Physical Exam   GENERAL: Healthy, alert and no distress  EYES: Eyes grossly normal to inspection.  No discharge or erythema, or obvious scleral/conjunctival abnormalities.  RESP: No audible wheeze, cough, or visible cyanosis.  No visible retractions or increased work of breathing.    SKIN: Visible skin clear. No significant rash, abnormal pigmentation or lesions.  NEURO: Cranial nerves grossly intact.  Mentation and speech appropriate for age.  PSYCH: Mentation appears normal, affect normal/bright, judgement and insight intact, normal speech and appearance well-groomed.                  Video-Visit Details    Type of service:  Video Visit    Video End Time:8:19 AM    Originating Location (pt. Location): Home    Distant Location (provider location):  [facility]     Platform used for Video Visit: grabHalo

## 2021-03-02 NOTE — PATIENT INSTRUCTIONS
1) I refilled the finasteride 5mg  for 1/4 tablet daily   2) Schedule with dermatology to assess your eyebrow hair loss  3) Schedule with ENT for your persistent sinus symptoms

## 2021-03-03 ENCOUNTER — TRANSFERRED RECORDS (OUTPATIENT)
Dept: HEALTH INFORMATION MANAGEMENT | Facility: CLINIC | Age: 31
End: 2021-03-03

## 2021-03-09 NOTE — TELEPHONE ENCOUNTER
Provider E-Visit time total (minutes): 0    Reviewed as PCP no longer working.  This issue was addressed 3/2/21.

## 2021-04-22 ENCOUNTER — TELEPHONE (OUTPATIENT)
Dept: FAMILY MEDICINE | Facility: CLINIC | Age: 31
End: 2021-04-22

## 2021-04-22 ENCOUNTER — TRANSFERRED RECORDS (OUTPATIENT)
Dept: HEALTH INFORMATION MANAGEMENT | Facility: CLINIC | Age: 31
End: 2021-04-22

## 2021-04-22 ENCOUNTER — OFFICE VISIT (OUTPATIENT)
Dept: FAMILY MEDICINE | Facility: CLINIC | Age: 31
End: 2021-04-22
Payer: COMMERCIAL

## 2021-04-22 VITALS
TEMPERATURE: 97 F | SYSTOLIC BLOOD PRESSURE: 115 MMHG | OXYGEN SATURATION: 99 % | WEIGHT: 218 LBS | DIASTOLIC BLOOD PRESSURE: 69 MMHG | BODY MASS INDEX: 30.86 KG/M2 | HEART RATE: 66 BPM

## 2021-04-22 DIAGNOSIS — J34.2 DEVIATED NASAL SEPTUM: ICD-10-CM

## 2021-04-22 DIAGNOSIS — R09.81 NASAL CONGESTION: ICD-10-CM

## 2021-04-22 DIAGNOSIS — Z01.818 PRE-OPERATIVE GENERAL PHYSICAL EXAMINATION: Primary | ICD-10-CM

## 2021-04-22 LAB
ALBUMIN SERPL-MCNC: 4 G/DL (ref 3.4–5)
ALP SERPL-CCNC: 75 U/L (ref 40–150)
ALT SERPL W P-5'-P-CCNC: 42 U/L (ref 0–70)
ANION GAP SERPL CALCULATED.3IONS-SCNC: 6 MMOL/L (ref 3–14)
AST SERPL W P-5'-P-CCNC: 19 U/L (ref 0–45)
BASOPHILS # BLD AUTO: 0 10E9/L (ref 0–0.2)
BASOPHILS NFR BLD AUTO: 0.4 %
BILIRUB SERPL-MCNC: 0.7 MG/DL (ref 0.2–1.3)
BUN SERPL-MCNC: 15 MG/DL (ref 7–30)
CALCIUM SERPL-MCNC: 8.8 MG/DL (ref 8.5–10.1)
CHLORIDE SERPL-SCNC: 106 MMOL/L (ref 94–109)
CO2 SERPL-SCNC: 28 MMOL/L (ref 20–32)
CREAT SERPL-MCNC: 0.94 MG/DL (ref 0.66–1.25)
DIFFERENTIAL METHOD BLD: NORMAL
EOSINOPHIL # BLD AUTO: 0.1 10E9/L (ref 0–0.7)
EOSINOPHIL NFR BLD AUTO: 1.5 %
ERYTHROCYTE [DISTWIDTH] IN BLOOD BY AUTOMATED COUNT: 12.2 % (ref 10–15)
GFR SERPL CREATININE-BSD FRML MDRD: >90 ML/MIN/{1.73_M2}
GLUCOSE SERPL-MCNC: 94 MG/DL (ref 70–99)
HBA1C MFR BLD: 5.2 % (ref 0–5.6)
HCT VFR BLD AUTO: 46 % (ref 40–53)
HGB BLD-MCNC: 16.2 G/DL (ref 13.3–17.7)
LYMPHOCYTES # BLD AUTO: 1.7 10E9/L (ref 0.8–5.3)
LYMPHOCYTES NFR BLD AUTO: 31.5 %
MCH RBC QN AUTO: 31 PG (ref 26.5–33)
MCHC RBC AUTO-ENTMCNC: 35.2 G/DL (ref 31.5–36.5)
MCV RBC AUTO: 88 FL (ref 78–100)
MONOCYTES # BLD AUTO: 0.6 10E9/L (ref 0–1.3)
MONOCYTES NFR BLD AUTO: 10.9 %
NEUTROPHILS # BLD AUTO: 3.1 10E9/L (ref 1.6–8.3)
NEUTROPHILS NFR BLD AUTO: 55.7 %
PLATELET # BLD AUTO: 205 10E9/L (ref 150–450)
POTASSIUM SERPL-SCNC: 4 MMOL/L (ref 3.4–5.3)
PROT SERPL-MCNC: 7.4 G/DL (ref 6.8–8.8)
RBC # BLD AUTO: 5.23 10E12/L (ref 4.4–5.9)
SODIUM SERPL-SCNC: 140 MMOL/L (ref 133–144)
WBC # BLD AUTO: 5.5 10E9/L (ref 4–11)

## 2021-04-22 PROCEDURE — 85025 COMPLETE CBC W/AUTO DIFF WBC: CPT | Performed by: FAMILY MEDICINE

## 2021-04-22 PROCEDURE — 36415 COLL VENOUS BLD VENIPUNCTURE: CPT | Performed by: FAMILY MEDICINE

## 2021-04-22 PROCEDURE — 99214 OFFICE O/P EST MOD 30 MIN: CPT | Performed by: FAMILY MEDICINE

## 2021-04-22 PROCEDURE — 83036 HEMOGLOBIN GLYCOSYLATED A1C: CPT | Performed by: FAMILY MEDICINE

## 2021-04-22 PROCEDURE — 80053 COMPREHEN METABOLIC PANEL: CPT | Performed by: FAMILY MEDICINE

## 2021-04-22 SDOH — HEALTH STABILITY: MENTAL HEALTH: HOW OFTEN DO YOU HAVE A DRINK CONTAINING ALCOHOL?: 2-3 TIMES A WEEK

## 2021-04-22 SDOH — HEALTH STABILITY: MENTAL HEALTH: HOW OFTEN DO YOU HAVE 6 OR MORE DRINKS ON ONE OCCASION?: NOT ASKED

## 2021-04-22 SDOH — HEALTH STABILITY: MENTAL HEALTH: HOW MANY STANDARD DRINKS CONTAINING ALCOHOL DO YOU HAVE ON A TYPICAL DAY?: 1 OR 2

## 2021-04-22 NOTE — LETTER
April 27, 2021      Mikhail Sinha  [address] [place] [address]        Dear ,    We are writing to inform you of your test results.    Your results came back and are within acceptable limits. -Liver and gallbladder tests are normal (ALT,AST, Alk phos, bilirubin), kidney function is normal (Cr, GFR), sodium is normal, potassium is normal, calcium is normal, glucose is normal.. If you have any further concerns please do not hesitate to contact us by message, phone or making an appointment.   Have a good day     Resulted Orders   CBC with platelets differential   Result Value Ref Range    WBC 5.5 4.0 - 11.0 10e9/L    RBC Count 5.23 4.4 - 5.9 10e12/L    Hemoglobin 16.2 13.3 - 17.7 g/dL    Hematocrit 46.0 40.0 - 53.0 %    MCV 88 78 - 100 fl    MCH 31.0 26.5 - 33.0 pg    MCHC 35.2 31.5 - 36.5 g/dL    RDW 12.2 10.0 - 15.0 %    Platelet Count 205 150 - 450 10e9/L    Diff Method Automated Method     % Neutrophils 55.7 %    % Lymphocytes 31.5 %    % Monocytes 10.9 %    % Eosinophils 1.5 %    % Basophils 0.4 %    Absolute Neutrophil 3.1 1.6 - 8.3 10e9/L    Absolute Lymphocytes 1.7 0.8 - 5.3 10e9/L    Absolute Monocytes 0.6 0.0 - 1.3 10e9/L    Absolute Eosinophils 0.1 0.0 - 0.7 10e9/L    Absolute Basophils 0.0 0.0 - 0.2 10e9/L   Comprehensive metabolic panel   Result Value Ref Range    Sodium 140 133 - 144 mmol/L    Potassium 4.0 3.4 - 5.3 mmol/L    Chloride 106 94 - 109 mmol/L    Carbon Dioxide 28 20 - 32 mmol/L    Anion Gap 6 3 - 14 mmol/L    Glucose 94 70 - 99 mg/dL    Urea Nitrogen 15 7 - 30 mg/dL    Creatinine 0.94 0.66 - 1.25 mg/dL    GFR Estimate >90 >60 mL/min/[1.73_m2]      Comment:      Non  GFR Calc  Starting 12/18/2018, serum creatinine based estimated GFR (eGFR) will be   calculated using the Chronic Kidney Disease Epidemiology Collaboration   (CKD-EPI) equation.      GFR Estimate If Black >90 >60 mL/min/[1.73_m2]      Comment:       GFR Calc  Starting  12/18/2018, serum creatinine based estimated GFR (eGFR) will be   calculated using the Chronic Kidney Disease Epidemiology Collaboration   (CKD-EPI) equation.      Calcium 8.8 8.5 - 10.1 mg/dL    Bilirubin Total 0.7 0.2 - 1.3 mg/dL    Albumin 4.0 3.4 - 5.0 g/dL    Protein Total 7.4 6.8 - 8.8 g/dL    Alkaline Phosphatase 75 40 - 150 U/L    ALT 42 0 - 70 U/L    AST 19 0 - 45 U/L   Hemoglobin A1c   Result Value Ref Range    Hemoglobin A1C 5.2 0 - 5.6 %      Comment:      Normal <5.7% Prediabetes 5.7-6.4%  Diabetes 6.5% or higher - adopted from ADA   consensus guidelines.       If you have any questions or concerns, please call the clinic at the number listed above.   Sincerely,  Chloé Kwok MD/nr

## 2021-04-22 NOTE — PATIENT INSTRUCTIONS
Labs and diagnostics today   If stable will clear for surgery otherwise may need additional work up   Take no meds* am of surgery  Hold aspirin, antiinflammatories like motrin aleve etc, fish oil and glucosamine preferable 5 to 7 days prior to surgery  Will fax /send note to surgeon once completed       Preparing for Your Surgery  Getting started  A nurse will call you to review your health history and instructions. They will give you an arrival time based on your scheduled surgery time.  Please be ready to share the following:    Your doctor's clinic name and phone number    Your medical, surgical and anesthesia history    A list of allergies and sensitivities    A list of medicines, including herbal treatments and over-the-counter drugs    Whether the patient has a legal guardian (ask how to send us the papers in advance)  If you have a child who's having surgery, please ask for a copy of Preparing for Your Child's Surgery.    Preparing for surgery    Within 30 days of surgery: Have a pre-op exam (sometimes called an H&P, or History and Physical). This can be done at a clinic or pre-operative center.  ? If you're having a , you may not need this exam. Talk to your care team    At your pre-op exam, talk to your care team about all medicines you take. If you need to stop any medicines before surgery, ask when to start taking them again.  ? We do this for your safety. Many medicines can make you bleed too much during surgery. Some change how well surgery (anesthesia) drugs work.    Call your insurance company to let them know you're having surgery. (If you don't have insurance, call [phone].)    Call your clinic if there's any change in your health. This includes signs of a cold or flu (sore throat, runny nose, cough, rash, fever). It also includes a scrape or scratch near the surgery site.    If you have questions on the day of surgery, call your hospital or surgery center.  Eating and drinking  guidelines  For your safety: Unless your surgeon tells you otherwise, follow the guidelines below.    Eat and drink as usual until 8 hours before surgery. After that, no food or milk.    Drink clear liquids until 2 hours before surgery. These are liquids you can see through, like water, Gatorade and Propel Water. You may also have black coffee and tea (no cream or milk).    Nothing by mouth within 2 hours of surgery. This includes gum, candy and breath mints.    If you drink, stop drinking alcohol the night before surgery.    If your care team tells you to take medicine on the morning of surgery, it's okay to take it with a sip of water.  Preventing infection    Shower or bathe the night before and morning of your surgery. Follow the instructions your clinic gave you. (If no instructions, use regular soap.)    Don't shave or clip hair near your surgery site. We'll remove the hair if needed.    Don't smoke or vape the morning of surgery. You may chew nicotine gum up to 2 hours before surgery. A nicotine patch is okay.  ? Note: Some surgeries require you to completely quit smoking and nicotine. Check with your surgeon.    Your care team will make every effort to keep you safe from infection. We will:  ? Clean our hands often with soap and water (or an alcohol-based hand rub).  ? Clean the skin at your surgery site with a special soap that kills germs.  ? Give you a special gown to keep you warm. (Cold raises the risk of infection.)  ? Wear special hair covers, masks, gowns and gloves during surgery.  ? Give antibiotic medicine, if prescribed. Not all surgeries need antibiotics.  What to bring on the day of surgery    Photo ID and insurance card    Copy of your health care directive, if you have one    Glasses and hearing aides (bring cases)  ? You can't wear contacts during surgery    Inhaler and eye drops, if you use them (tell us about these when you arrive)    CPAP machine or breathing device, if you use them    A  few personal items, if spending the night    If you have . . .  ? A pacemaker or ICD (cardiac defibrillator): Bring the ID card.  ? An implanted stimulator: Bring the remote control.  ? A legal guardian: Bring a copy of the certified (court-stamped) guardianship papers.  Please remove any jewelry, including body piercings. Leave jewelry and other valuables at home.  If you're going home the day of surgery  Important: If you don't follow the rules below, we must cancel your surgery.     Arrange for someone to drive you home after surgery. You may not drive, take a taxi or take public transportation by yourself (unless you'll have local anesthesia only).    Arrange for a responsible adult to stay with you overnight. If you don't, we may keep you in the hospital overnight, and you may need to pay the costs yourself.  Questions?   If you have any questions for your care team, list them here: _________________________________________________________________________________________________________________________________________________________________________________________________________________________________________________________________________________________________________________________  For informational purposes only. Not to replace the advice of your health care provider. Copyright   2003, 2019 [employer]. All rights reserved. Clinically reviewed by Lisa Fuller MD. [employer] [id number] - REV 4/20.

## 2021-04-22 NOTE — RESULT ENCOUNTER NOTE
Gabrielle Mr. Sinha,  Some of your results came back and are within acceptable limits. -Normal red blood cell (hgb) levels, normal white blood cell count and normal platelet levels.. If you have any further concerns please do not hesitate to contact us by message, phone or making an appointment.  Have a good day   Dr Sundar VIZCAINO

## 2021-04-22 NOTE — PROGRESS NOTES
[facility] [address] [place] [address]  Phone: [phone]  Primary Provider: Cecile Martinez  Pre-op Performing Provider: BERENICE HARRIS    PREOPERATIVE EVALUATION:  Today's date: 4/22/2021    Mikhail Sinha is a 30 year old male who presents for a preoperative evaluation.    Surgical Information:  Surgery/Procedure: nasal septoplasty and sinus surgery   Surgery Location: [facility]   Surgeon:    Surgery Date: 5/4/2021  Time of Surgery: 8:30am   Where patient plans to recover: At home with family  Fax number for surgical facility: [phone] attn:      Type of Anesthesia Anticipated: to be determined    Assessment & Plan     The proposed surgical procedure is considered INTERMEDIATE risk.    Pre-operative general physical examination for DNS and sinus surgery for blocked right nose and congestion  Optimized for surgery. NO modifiable risk factors identified.   Labs and diagnostics today   If stable will clear for surgery otherwise may need additional work up   covid testing per surgeon  Take no meds am of surgery  Hold aspirin, antiinflammatories like motrin aleve etc, fish oil and glucosamine preferable 5 to 7 days prior to surgery  Will fax /send note to surgeon once completed   Weight loss recommended, monitor for snoring  Return to PCP Dr Martinez for routine preventive care   - CBC with platelets differential  - Comprehensive metabolic panel  - Hemoglobin A1c    Deviated nasal septum  Under care of ENT  - CBC with platelets differential    Nasal congestion  To get surgery   - CBC with platelets differential    BMI 30.0-30.9,adult  Weight loss encouraged, monitor for undiagnosed Sleep apnea during procedure  - Comprehensive metabolic panel  - Hemoglobin A1c    Risks and Recommendations:  The patient has the following additional risks and recommendations for perioperative complications:   - No identified additional risk factors other  than previously addressed    Medication Instructions:  Patient is on no chronic medications    RECOMMENDATION:  APPROVAL GIVEN to proceed with proposed procedure, without further diagnostic evaluation.    21 minutes spent on the date of the encounter doing chart review, history and exam, documentation and further activities per the note    Subjective     HPI related to upcoming procedure:   Preop for septoplasty and sinus surgery due to DNS and turbinates hypertrophy , to get shaved and widen sinus   Due to persistent infection  unable to breathe out of right nostril    Preop Questions 4/22/2021   1. Have you ever had a heart attack or stroke? No   2. Have you ever had surgery on your heart or blood vessels, such as a stent placement, a coronary artery bypass, or surgery on an artery in your head, neck, heart, or legs? No   3. Do you have chest pain with activity? No   4. Do you have a history of  heart failure? No   5. Do you currently have a cold, bronchitis or symptoms of other infection? No   6. Do you have a cough, shortness of breath, or wheezing? No   7. Do you or anyone in your family have previous history of blood clots? No   8. Do you or does anyone in your family have a serious bleeding problem such as prolonged bleeding following surgeries or cuts? No   9. Have you ever had problems with anemia or been told to take iron pills? No   10. Have you had any abnormal blood loss such as black, tarry or bloody stools? No   11. Have you ever had a blood transfusion? No   12. Are you willing to have a blood transfusion if it is medically needed before, during, or after your surgery? Yes   13. Have you or any of your relatives ever had problems with anesthesia? UNKNOWN . Getting anesthesia first time   14. Do you have sleep apnea, excessive snoring or daytime drowsiness? No   15. Do you have any artifical heart valves or other implanted medical devices like a pacemaker, defibrillator, or continuous glucose monitor?  No   16. Do you have artificial joints? No   17. Are you allergic to latex? No     Health Care Directive:  Patient does not have a Health Care Directive or Living Will: Discussed advance care planning with patient; information given to patient to review.    Preoperative Review of :   reviewed - no record of controlled substances prescribed.    Status of Chronic Conditions:  none    Hx of BMI 30, prior low back pain, left varicocele, thinning hair on finasteride, alopecia eye brows, under care of derm, previously on sertraline for anxiety, allergic to dust, under care of PCP dr Martinez, seen by PCP 3/2/21 for lateral eye brow thinning and nasal congestion and hx of paroxysmal hemicrania prior MRI joel normal, improved anxiety and hx of male pattern baldness, referred to derm and ENT  Currently No fever or chills, no headache or dizziness, no double or blurry vision, no facial pain, earache, sore throat, runny nose, post nasal drip, no trouble hearing, smelling, tasting or swallowing, no cough , no chest pain, trouble breathing or palpitations, No abdominal pain, heart burn, reflux, nausea or vomiting or diarrhea or constipation, no blood in stools or black stools, no weight loss or night sweats. No dysuria, hematuria, frequency, urgency, hesitancy, incontinence, No pelvic complaints. No leg swelling or joint pain. No rash.    Review of Systems  CONSTITUTIONAL: NEGATIVE for fever, chills, change in weight  INTEGUMENTARY/SKIN: NEGATIVE for worrisome rashes, moles or lesions  EYES: NEGATIVE for vision changes or irritation  ENT/MOUTH: NEGATIVE for ear, mouth and throat problems  RESP: NEGATIVE for significant cough or SOB  BREAST: NEGATIVE for masses, tenderness or discharge  CV: NEGATIVE for chest pain, palpitations or peripheral edema  GI: NEGATIVE for nausea, abdominal pain, heartburn, or change in bowel habits  : NEGATIVE for frequency, dysuria, or hematuria  MUSCULOSKELETAL: NEGATIVE for significant  arthralgias or myalgia  NEURO: NEGATIVE for weakness, dizziness or paresthesias  ENDOCRINE: NEGATIVE for temperature intolerance, skin/hair changes  HEME: NEGATIVE for bleeding problems  PSYCHIATRIC: NEGATIVE for changes in mood or affect    Patient Active Problem List    Diagnosis Date Noted     Left varicocele 03/09/2016     Priority: Medium     Overweight 05/01/2015     Priority: Medium      No past medical history on file.  No past surgical history on file.  Current Outpatient Medications   Medication Sig Dispense Refill     finasteride (PROSCAR) 5 MG tablet Take 1/4 tablet by mouth daily. 30 tablet 3       Allergies   Allergen Reactions     Dust Mites         Social History     Tobacco Use     Smoking status: Never Smoker     Smokeless tobacco: Never Used   Substance Use Topics     Alcohol use: Yes     Family History   Problem Relation Age of Onset     Genetic Disorder Mother         celiac     Anxiety Disorder Sister      Substance Abuse Maternal Grandfather         alcoholic     History   Drug Use Unknown     Comment: Back in college         Objective     /69 (BP Location: Left arm, Patient Position: Sitting, Cuff Size: Adult Regular)   Pulse 66   Temp 97  F (36.1  C) (Tympanic)   Wt 98.9 kg (218 lb)   SpO2 99%   BMI 30.86 kg/m      Physical Exam    GENERAL APPEARANCE: healthy, alert, active, no distress, cooperative and obese     EYES: EOMI,  PERRL     HENT: ear canals and TM's normal and nose and mouth without ulcers or lesions     NECK: no adenopathy, no asymmetry, masses, or scars and thyroid normal to palpation     RESP: lungs clear to auscultation - no rales, rhonchi or wheezes     CV: regular rates and rhythm, normal S1 S2, no S3 or S4 and no murmur, click or rub     ABDOMEN:  soft, non tender, no HSM or masses and bowel sounds normal     MS: extremities normal- no gross deformities noted, no evidence of inflammation in joints, FROM in all extremities.     SKIN: no suspicious lesions or  rashes     NEURO: Normal strength and tone, sensory exam grossly normal, mentation intact and speech normal     PSYCH: mentation appears normal. and affect normal/bright     LYMPHATICS: No cervical adenopathy    Recent Labs   Lab Test 08/27/20  0728      POTASSIUM 4.2   CR 1.01        Diagnostics:  No results found for this or any previous visit (from the past 24 hour(s)).   No EKG required, no history of coronary heart disease, significant arrhythmia, peripheral arterial disease or other structural heart disease.    Revised Cardiac Risk Index (RCRI):  The patient has the following serious cardiovascular risks for perioperative complications:   - No serious cardiac risks = 0 points     RCRI Interpretation: 0 points: Class I (very low risk - 0.4% complication rate)           Signed Electronically by: Chloé Kwok MD  Copy of this evaluation report is provided to requesting physician.

## 2021-04-22 NOTE — LETTER
April 30, 2021      Mikhail Sinha  [address] [place] [id number]        Dear ,    We are writing to inform you of your test results.    Your results came back and are within acceptable limits. -Liver and gallbladder tests are normal (ALT,AST, Alk phos, bilirubin), kidney function is normal (Cr, GFR), sodium is normal, potassium is normal, calcium is normal, glucose is normal.. If you have any further concerns please do not hesitate to contact us by message, phone or making an appointment.     Resulted Orders   CBC with platelets differential   Result Value Ref Range    WBC 5.5 4.0 - 11.0 10e9/L    RBC Count 5.23 4.4 - 5.9 10e12/L    Hemoglobin 16.2 13.3 - 17.7 g/dL    Hematocrit 46.0 40.0 - 53.0 %    MCV 88 78 - 100 fl    MCH 31.0 26.5 - 33.0 pg    MCHC 35.2 31.5 - 36.5 g/dL    RDW 12.2 10.0 - 15.0 %    Platelet Count 205 150 - 450 10e9/L    Diff Method Automated Method     % Neutrophils 55.7 %    % Lymphocytes 31.5 %    % Monocytes 10.9 %    % Eosinophils 1.5 %    % Basophils 0.4 %    Absolute Neutrophil 3.1 1.6 - 8.3 10e9/L    Absolute Lymphocytes 1.7 0.8 - 5.3 10e9/L    Absolute Monocytes 0.6 0.0 - 1.3 10e9/L    Absolute Eosinophils 0.1 0.0 - 0.7 10e9/L    Absolute Basophils 0.0 0.0 - 0.2 10e9/L   Comprehensive metabolic panel   Result Value Ref Range    Sodium 140 133 - 144 mmol/L    Potassium 4.0 3.4 - 5.3 mmol/L    Chloride 106 94 - 109 mmol/L    Carbon Dioxide 28 20 - 32 mmol/L    Anion Gap 6 3 - 14 mmol/L    Glucose 94 70 - 99 mg/dL    Urea Nitrogen 15 7 - 30 mg/dL    Creatinine 0.94 0.66 - 1.25 mg/dL    GFR Estimate >90 >60 mL/min/[1.73_m2]      Comment:      Non  GFR Calc  Starting 12/18/2018, serum creatinine based estimated GFR (eGFR) will be   calculated using the Chronic Kidney Disease Epidemiology Collaboration   (CKD-EPI) equation.      GFR Estimate If Black >90 >60 mL/min/[1.73_m2]      Comment:       GFR Calc  Starting 12/18/2018, serum  creatinine based estimated GFR (eGFR) will be   calculated using the Chronic Kidney Disease Epidemiology Collaboration   (CKD-EPI) equation.      Calcium 8.8 8.5 - 10.1 mg/dL    Bilirubin Total 0.7 0.2 - 1.3 mg/dL    Albumin 4.0 3.4 - 5.0 g/dL    Protein Total 7.4 6.8 - 8.8 g/dL    Alkaline Phosphatase 75 40 - 150 U/L    ALT 42 0 - 70 U/L    AST 19 0 - 45 U/L   Hemoglobin A1c   Result Value Ref Range    Hemoglobin A1C 5.2 0 - 5.6 %      Comment:      Normal <5.7% Prediabetes 5.7-6.4%  Diabetes 6.5% or higher - adopted from ADA   consensus guidelines.         If you have any questions or concerns, please call the clinic at the number listed above.       Sincerely,      Chloé Kwok MD

## 2021-04-22 NOTE — RESULT ENCOUNTER NOTE
Gabrielle Mr. Oakesdeangelo,  Some of your results came back and are within acceptable limits.   No diabetes    Also please remember to get covid tttsing per your surgeons office prior to surgery   If you have any further concerns please do not hesitate to contact us by message, phone or making an appointment.  Have a good day   Dr Sundar VIZCAINO

## 2021-04-23 NOTE — RESULT ENCOUNTER NOTE
Please fax preop note plus preop form with labs to surgeons office thanks     Gabrielle Mr. Sinha,  Your results came back and are within acceptable limits. -Liver and gallbladder tests are normal (ALT,AST, Alk phos, bilirubin), kidney function is normal (Cr, GFR), sodium is normal, potassium is normal, calcium is normal, glucose is normal.. If you have any further concerns please do not hesitate to contact us by message, phone or making an appointment.  Have a good day   Dr Sundar VIZCAINO

## 2021-04-26 NOTE — PROGRESS NOTES
I faxed to the surgery center at [phone] and put copy in faxed forms folder for Dr. Kwok. I put the faxed form in abstract.     Maribel Logan on 4/26/2021 at 10:17 AM

## 2021-05-04 ENCOUNTER — HOSPITAL PATHOLOGY (OUTPATIENT)
Dept: OTHER | Facility: CLINIC | Age: 31
End: 2021-05-04

## 2021-05-05 LAB — COPATH REPORT: NORMAL

## 2021-05-10 ENCOUNTER — TRANSFERRED RECORDS (OUTPATIENT)
Dept: HEALTH INFORMATION MANAGEMENT | Facility: CLINIC | Age: 31
End: 2021-05-10

## 2021-06-01 ENCOUNTER — TRANSFERRED RECORDS (OUTPATIENT)
Dept: HEALTH INFORMATION MANAGEMENT | Facility: CLINIC | Age: 31
End: 2021-06-01

## 2021-06-11 ENCOUNTER — TRANSFERRED RECORDS (OUTPATIENT)
Dept: HEALTH INFORMATION MANAGEMENT | Facility: CLINIC | Age: 31
End: 2021-06-11

## 2021-07-16 ENCOUNTER — TRANSFERRED RECORDS (OUTPATIENT)
Dept: HEALTH INFORMATION MANAGEMENT | Facility: CLINIC | Age: 31
End: 2021-07-16

## 2021-08-24 NOTE — PATIENT INSTRUCTIONS
Orders Placed This Encounter     OTOLARYNGOLOGY REFERRAL     Referral Priority:   Routine     Referral Type:   Consultation     Number of Visits Requested:   1     sertraline (ZOLOFT) 25 MG tablet     Sig: Take 1 tablet (25 mg) by mouth daily     Dispense:  60 tablet     Refill:  3     Start with 1/2 tablet for 1 week then increase to full tablet  
left hip fracture

## 2021-09-11 ENCOUNTER — HEALTH MAINTENANCE LETTER (OUTPATIENT)
Age: 31
End: 2021-09-11

## 2021-09-16 ENCOUNTER — TRANSFERRED RECORDS (OUTPATIENT)
Dept: HEALTH INFORMATION MANAGEMENT | Facility: CLINIC | Age: 31
End: 2021-09-16

## 2022-01-01 ENCOUNTER — HEALTH MAINTENANCE LETTER (OUTPATIENT)
Age: 32
End: 2022-01-01

## 2022-10-30 ENCOUNTER — HEALTH MAINTENANCE LETTER (OUTPATIENT)
Age: 32
End: 2022-10-30

## 2023-04-08 ENCOUNTER — HEALTH MAINTENANCE LETTER (OUTPATIENT)
Age: 33
End: 2023-04-08

## 2023-04-18 ENCOUNTER — TELEPHONE (OUTPATIENT)
Dept: PULMONOLOGY | Facility: CLINIC | Age: 33
End: 2023-04-18
Payer: COMMERCIAL

## 2023-04-18 NOTE — TELEPHONE ENCOUNTER
I received a telephone call from Marshall regarding his desire to pursue cystic fibrosis carrier screening due to his partner Denise being found to be a CF carrier.  A genetic counseling appointment was scheduled at his convenience and he was encouraged to call with additional questions or concerns in the meantime.    Radha Caldwell MS, [facility]  Genetic Counselor  [facility]  [facility]

## 2023-05-04 ENCOUNTER — VIRTUAL VISIT (OUTPATIENT)
Dept: PULMONOLOGY | Facility: CLINIC | Age: 33
End: 2023-05-04
Attending: GENETIC COUNSELOR, MS
Payer: COMMERCIAL

## 2023-05-04 VITALS — BODY MASS INDEX: 30.1 KG/M2 | HEIGHT: 71 IN | WEIGHT: 215 LBS

## 2023-05-04 DIAGNOSIS — Z13.71 SCREENING FOR GENETIC DISEASE CARRIER STATUS: ICD-10-CM

## 2023-05-04 DIAGNOSIS — Z31.5 ENCOUNTER FOR PROCREATIVE GENETIC COUNSELING: ICD-10-CM

## 2023-05-04 DIAGNOSIS — Z84.81 FAMILY HISTORY OF CARRIER OF GENETIC DISEASE: Primary | ICD-10-CM

## 2023-05-04 PROCEDURE — 96040 HC GENETIC COUNSELING, EACH 30 MINUTES: CPT | Mod: GT,95 | Performed by: GENETIC COUNSELOR, MS

## 2023-05-04 ASSESSMENT — PAIN SCALES - GENERAL: PAINLEVEL: NO PAIN (0)

## 2023-05-04 NOTE — PROGRESS NOTES
"Virtual Visit Details    Type of service:  Video Visit     Originating Location (pt. Location): Home  Distant Location (provider location): Off-site  Platform used for Video Visit: AiramNangate    Video Visit Start Time: 10:01am  Video Visit EndTime:  10:41am      Presenting Information:  Mikhail \"Hussain Sinha is a 32-year-old man whose wife Denise was recently found to be a carrier for cystic fibrosis (CF).  I met with Marshall and Denise today for genetic counseling and carrier screening.  During today's visit a family history was collected, the genetics and inheritance of cystic fibrosis were reviewed, and the options for carrier screening were discussed.  We also reviewed Dneise's carrier screening for fragile-X syndrome during our visit. At the conclusion of our visit Marshall opted to proceed with CF carrier screening only and informed consent was obtained.     Family History:  A detailed pedigree was obtained and scanned into the electronic medical record.  It is significant for the following:     Marshall himself is healthy.    Marshall and his wife are currently expecting their first child.  The pregnancy is approximately 20 weeks along.     Marshall has two brothers and a sister, all of whom are healthy and have healthy children.     Marshall's father is 72-years-old and healthy.    Marshall's mother is 70-years-old.  She has celiac disease but is otherwise healthy.      Marshall's wife Denise is 32-years-old and healthy.  She was recently found to be a carrier for cystic fibrosis.  She additionally had an intermediate repeat number of 46 on her fragile-X syndrome carrier screen.     Neither Marshall nor Denise have a known history of cystic fibrosis or other known genetic conditions.     Marshall is of [place]ian, Amharic, English, and [place] ancestry.  His wife Denise is of [place], [place], and Amharic ancestry.  Consanguinity was denied.     Discussion:  Today we reviewed that genes are long stretches of DNA " that are responsible for how our bodies look and how our bodies work. We all have two copies of every gene; one inherited from the mother and one inherited from the father. When there is a change, called a mutation, in a gene it can cause it to not do its job correctly which can cause the signs and symptoms of a genetic condition. Cystic fibrosis is caused by mutations in a gene called CFTR.      Cystic fibrosis is inherited in an autosomal recessive pattern. This means that to be affected an individual must inherit a mutation on both copies of the CFTR gene (one from each parent). Individuals with just one mutation in the CFTR gene are said to be carriers. Carriers do not have cystic fibrosis but can have an affected child if their partner is also a carrier. When both parents are carriers, with each pregnancy there is a 25% chance for the child to be affected, a 50% chance for the child to be a carrier only, and a 25% chance for the child to be unaffected and not a carrier.      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  The notation of this mutation refers to its location and its effect.  The couple's current pregnancy and any future pregnancy has a 50% chance to have inherited this mutation from Denise and be a carrier for CF.  A child could actually have CF if Marshall is also a carrier.    Based on ancestry, Marshall's chance to be a carrier for CF is approximately 1/25.  Therefore prior to any additional screening for Marshall, the chance for the couple to have a child with cystic fibrosis is approximately 1/100 (1%).  If Marshall is a carrier the chance for this pregnancy and any future child to have CF would be 1/4 (25%).  Carrier screening can help refine this chance.  If after carrier screening only one parent is a carrier the chance for the couple to have a child with cystic fibrosis would be  significantly reduced (much less than 1%), but not zero.       We reviewed the options for carrier screening including for the CFTR gene only, or expanded carrier screening for a large panel of genes in addition to the CFTR gene.  We had a detailed discussion about the potential costs, benefits, and limitations of the options.  Our conversation included the reality that genetic testing is not perfect, and a normal result does not completely exclude the possibility of being a carrier.  There is also a possibility of identification of a milder CFTR variant associated with variable symptoms and/or a milder CFTR-related disorder.      Carrier screening is designed to offer information for reproductive purposes, but can incidentally identify information that may be relevant to Marshall's own health.  We also discussed that this screen will not offer information about other untested conditions.  There are also genetic conditions not inherited from a parent that are new in a child, as well as health conditions not genetic in nature.  After discussion Marshall opted to proceed with carrier screening for cystic fibrosis only.  Verbal informed consent was obtained.      Carrier screening will be performed at [facility] laboratory who will contact Marshall directly with an estimate of insurance benefits and option for out of pocket billing.  Results are expected in approximately 2-3 weeks and I will contact him by telephone when results return.  Marshall did give permission to share his results with Denise.  He also agreed to the standard 7-day hold of genetic results from [facility] for 7 days.  Additional recommendations will be made following results disclosure.      We briefly discussed reproductive options available to the couple in the event both are found to be carriers for cystic fibrosis or another condition.  If the family wished to determine during this pregnancy whether the baby is affected, prenatal testing is available.   At this stage in pregnancy the option would be an amniocentesis.  This procedure obtains a small sample of the amniotic fluid respectively to test for cystic fibrosis.  This procedure does carry a small risk for miscarriage.      If the couple wished to avoid an affected pregnancy there is an option called pre-implantation genetic diagnosis (PGD).  PGD uses in vitro fertilization (IVF) to create very early embryos outside of the body.  The embryos are then tested for cystic fibrosis and only the unaffected embryos are implanted.  Alternative options include egg or sperm donation, or adoption.  The family could also, of course, wait until a baby is born to be tested.  With each of these options there are medical, financial, ethical, and emotional considerations that every family must weigh for themselves.      Finally, during our visit we also discussed the other notable finding on Denise's carrier screening.  She was found to have an intermediate repeat number on her screen for fragile-X syndrome.  Fragile-X syndrome is a genetic condition caused by a gene called FMR1 which is on the X-chromosome.  Females have two copies of the X-chromosome while males have just one.      Within the FMR1 gene there is an area that has multiple copies of the same DNA sequence, CGG, repeated many times.  A normal repeat number is is 5-44.  These individuals do not have fragile X syndrome and are not at risk for having children with fragile-X syndrome.  A full fragile-X mutation is greater than 200 repeats.  This causes the signs and symptoms of fragile-X syndrome include intellectual disability, developmental delay, behavioral issues, and an increased chance for autism spectrum disorders and seizures.      A lower but still abnormal number of repeats () typically is not associated with intellectual disability but can cause other, different, health concerns.  This is called a premutation.  Woman with a premutation are at risk  for premature ovarian failure which can cause irregular periods and early menopause.  Men and sometimes women with a premutation are at risk for a movement disorder with symptoms including tremor and ataxia (balance and coordination problems) and symptoms of dementia in older age.  Additionally, women with a premutation are at risk for the CGG repeat number to expand, meaning get longer, in their children.  Therefore a woman with a premutation can have a child with fragile-X syndrome. An intermediate repeat number is considered 45-54.  Denise's CGG repeat numbers were 30 and 46.      Denise's intermediate repeat number of 46 is considered to be in the intermediate range.  The chance for this repeat number to expand into a full mutation and cause fragil-X syndrome in a child is extremely low.  She is also likely not at an increased risk for premature ovarian insufficiency based on this repeat number.  However, it is possible this repeat number is slightly higher in other relatives and it could expand in subsequent generations.  I therefore encouraged Denise to notify her relatives about this result, as well as the CF carrier screening results, and the availability of testing.        I appreciate the opportunity to be a part of Marshall and his wife's care today.  My contact information was shared should he have any additional questions or concerns.       Plan:  1.  Carrier screening of the CFTR gene, to be performed at [facility]  2.  [facility] will contact Marshall directly with an estimation of insurance benefits   3.  A lab visit is scheduled for tomorrow for sample collection  4.  I will contact Marshall when results return in approximately 2-3 weeks   5.  Follow-up as determined by results of the above screening       Radha Caldwell MS, [facility]  Genetic Counselor  [facility]  [facility]

## 2023-05-04 NOTE — LETTER
"5/4/2023      RE: Mikhail Sinha  [address] [facility] [address]     Dear Colleague,    Thank you for the opportunity to participate in the care of your patient, Mikhail Sinha, at the [facility] at [facility]. Please see a copy of my visit note below.        Virtual Visit Details    Type of service:  Video Visit     Originating Location (pt. Location): Home  Distant Location (provider location): Off-site  Platform used for Video Visit: [email or web address]    Video Visit Start Time: 10:01am  Video Visit EndTime:  10:41am      Presenting Information:  Mikhail Johnson"Hussain Sinha is a 32-year-old man whose wife Denise was recently found to be a carrier for cystic fibrosis (CF).  I met with Marshall and Denise today for genetic counseling and carrier screening.  During today's visit a family history was collected, the genetics and inheritance of cystic fibrosis were reviewed, and the options for carrier screening were discussed.  We also reviewed Denise's carrier screening for fragile-X syndrome during our visit. At the conclusion of our visit Marshall opted to proceed with CF carrier screening only and informed consent was obtained.     Family History:  A detailed pedigree was obtained and scanned into the electronic medical record.  It is significant for the following:   Marshall himself is healthy.  Marshall and his wife are currently expecting their first child.  The pregnancy is approximately 20 weeks along.   Marshall has two brothers and a sister, all of whom are healthy and have healthy children.   Marshall's father is 72-years-old and healthy.  Marshall's mother is 70-years-old.  She has celiac disease but is otherwise healthy.    Marshall's wife Denise is 32-years-old and healthy.  She was recently found to be a carrier for cystic fibrosis.  She additionally had an intermediate repeat number of 46 on her fragile-X " syndrome carrier screen.   Neither Marshall nor Denise have a known history of cystic fibrosis or other known genetic conditions.   Marshall is of Swedish, Malay, English, and Hungarian ancestry.  His wife Denise is of Yi, Polish, and Malay ancestry.  Consanguinity was denied.     Discussion:  Today we reviewed that genes are long stretches of DNA that are responsible for how our bodies look and how our bodies work. We all have two copies of every gene; one inherited from the mother and one inherited from the father. When there is a change, called a mutation, in a gene it can cause it to not do its job correctly which can cause the signs and symptoms of a genetic condition. Cystic fibrosis is caused by mutations in a gene called CFTR.      Cystic fibrosis is inherited in an autosomal recessive pattern. This means that to be affected an individual must inherit a mutation on both copies of the CFTR gene (one from each parent). Individuals with just one mutation in the CFTR gene are said to be carriers. Carriers do not have cystic fibrosis but can have an affected child if their partner is also a carrier. When both parents are carriers, with each pregnancy there is a 25% chance for the child to be affected, a 50% chance for the child to be a carrier only, and a 25% chance for the child to be unaffected and not a carrier.      As noted above, Marshall's wife Denise was found to be a carrier for cystic fibrosis with one copy of a mutation called [id number] identified in the CFTR gene.  The notation of this mutation refers to its location and its effect.  The couple's current pregnancy and any future pregnancy has a 50% chance to have inherited this mutation from Denise and be a carrier for CF.  A child could actually have CF if Marshall is also a carrier.    Based on ancestry, Marshall's chance to be a carrier for CF is approximately 1/25.  Therefore prior to any additional screening for Marshall, the chance for the couple  to have a child with cystic fibrosis is approximately 1/100 (1%).  If Marshall is a carrier the chance for this pregnancy and any future child to have CF would be 1/4 (25%).  Carrier screening can help refine this chance.  If after carrier screening only one parent is a carrier the chance for the couple to have a child with cystic fibrosis would be significantly reduced (much less than 1%), but not zero.       We reviewed the options for carrier screening including for the CFTR gene only, or expanded carrier screening for a large panel of genes in addition to the CFTR gene.  We had a detailed discussion about the potential costs, benefits, and limitations of the options.  Our conversation included the reality that genetic testing is not perfect, and a normal result does not completely exclude the possibility of being a carrier.  There is also a possibility of identification of a milder CFTR variant associated with variable symptoms and/or a milder CFTR-related disorder.      Carrier screening is designed to offer information for reproductive purposes, but can incidentally identify information that may be relevant to Marshall's own health.  We also discussed that this screen will not offer information about other untested conditions.  There are also genetic conditions not inherited from a parent that are new in a child, as well as health conditions not genetic in nature.  After discussion Marshall opted to proceed with carrier screening for cystic fibrosis only.  Verbal informed consent was obtained.      Carrier screening [facility] performed [facility] who will contact Marshall directly with an estimate of insurance benefits and option for out of pocket billing.  Results are expected in approximately 2-3 weeks and I will contact him by telephone when results return.  Marshall did give permission to share his results with Denise.  He also agreed to the standard 7-day hold of genetic results from [facility] for 7  days.  Additional recommendations will be made following results disclosure.      We briefly discussed reproductive options available to the couple in the event both are found to be carriers for cystic fibrosis or another condition.  If the family wished to determine during this pregnancy whether the baby is affected, prenatal testing is available.  At this stage in pregnancy the option would be an amniocentesis.  This procedure obtains a small sample of the amniotic fluid respectively to test for cystic fibrosis.  This procedure does carry a small risk for miscarriage.      If the couple wished to avoid an affected pregnancy there is an option called pre-implantation genetic diagnosis (PGD).  PGD uses in vitro fertilization (IVF) to create very early embryos outside of the body.  The embryos are then tested for cystic fibrosis and only the unaffected embryos are implanted.  Alternative options include egg or sperm donation, or adoption.  The family could also, of course, wait until a baby is born to be tested.  With each of these options there are medical, financial, ethical, and emotional considerations that every family must weigh for themselves.      Finally, during our visit we also discussed the other notable finding on Denise's carrier screening.  She was found to have an intermediate repeat number on her screen for fragile-X syndrome.  Fragile-X syndrome is a genetic condition caused by a gene called FMR1 which is on the X-chromosome.  Females have two copies of the X-chromosome while males have just one.      Within the FMR1 gene there is an area that has multiple copies of the same DNA sequence, CGG, repeated many times.  A normal repeat number is is 5-44.  These individuals do not have fragile X syndrome and are not at risk for having children with fragile-X syndrome.  A full fragile-X mutation is greater than 200 repeats.  This causes the signs and symptoms of fragile-X syndrome include intellectual  disability, developmental delay, behavioral issues, and an increased chance for autism spectrum disorders and seizures.      A lower but still abnormal number of repeats () typically is not associated with intellectual disability but can cause other, different, health concerns.  This is called a premutation.  Woman with a premutation are at risk for premature ovarian failure which can cause irregular periods and early menopause.  Men and sometimes women with a premutation are at risk for a movement disorder with symptoms including tremor and ataxia (balance and coordination problems) and symptoms of dementia in older age.  Additionally, women with a premutation are at risk for the CGG repeat number to expand, meaning get longer, in their children.  Therefore a woman with a premutation can have a child with fragile-X syndrome. An intermediate repeat number is considered 45-54.  Denise's CGG repeat numbers were 30 and 46.      eDnise's intermediate repeat number of 46 is considered to be in the intermediate range.  The chance for this repeat number to expand into a full mutation and cause cystic fibrosis is extremely low.  She is also likely not at an increased risk for premature ovarian insufficiency based on this repeat number.  However, it is possible this repeat number is slightly higher in other relatives and it could expand in subsequent generations.  I therefore encouraged Denise to notify her relatives about this result, as well as the CF carrier screening results, and the availability of testing.        I appreciate the opportunity to be a part of Marshall and his wife's care today.  My contact information was shared should he have any additional questions or concerns.       Plan:  1.  Carrier screening of the CFTR gene, to be performed at [facility]  2.  [employer] [employer] will contact Marshall directly with an estimation of insurance benefits   3.  A lab visit is scheduled for tomorrow for sample  collection  4.  I will contact Marshall when results return in approximately 2-3 weeks   5.  Follow-up as determined by results of the above screening       Radha Caldwell MS, [facility] [place]  Genetic Counselor  [facility]  [facility]

## 2023-05-04 NOTE — NURSING NOTE
Is the patient currently in the state of [place]? YES    Visit mode:VIDEO    If the visit is dropped, the patient can be reconnected by: VIDEO VISIT: Text to cell phone: [phone]    Will anyone else be joining the visit? NO      How would you like to obtain your AVS? MyChart    Are changes needed to the allergy or medication list? NO    Reason for visit: Video Visit (New consult)

## 2023-05-05 ENCOUNTER — LAB (OUTPATIENT)
Dept: LAB | Facility: CLINIC | Age: 33
End: 2023-05-05
Attending: GENETIC COUNSELOR, MS
Payer: COMMERCIAL

## 2023-05-05 DIAGNOSIS — Z13.71 SCREENING FOR GENETIC DISEASE CARRIER STATUS: ICD-10-CM

## 2023-05-05 DIAGNOSIS — Z84.81 FAMILY HISTORY OF CARRIER OF GENETIC DISEASE: ICD-10-CM

## 2023-05-05 PROCEDURE — 36415 COLL VENOUS BLD VENIPUNCTURE: CPT

## 2023-05-17 ENCOUNTER — TELEPHONE (OUTPATIENT)
Dept: PULMONOLOGY | Facility: CLINIC | Age: 33
End: 2023-05-17
Payer: COMMERCIAL

## 2023-05-17 NOTE — TELEPHONE ENCOUNTER
I contacted Marshall to disclose and discuss the results of his recent carrier screening for cystic fibrosis (CF).  This was pursued due to Marshall's wife's known carrier status and current pregnancy.  Marshall's carrier screening has returned negative (normal) with no mutations identified in the CFTR gene. This very significantly reduces the chance for Marshall to be a carrier for classic CF from approximately 1/25 to approximately 1/4,400.  Therefore the pregnancies chance to have classic CF is also drastically reduced to approximately 1/17,600.  Marshall expressed understanding and was relieved to hear this news.  A results letter and copy of his lab report will be sent by mail.  I remain available for additional questions or concerns.      Radha Caldwell MS, [facility] [place]  Genetic Counselor  [facility]  [facility]

## 2023-05-18 LAB — SCANNED LAB RESULT: NORMAL

## 2023-08-24 ENCOUNTER — OFFICE VISIT (OUTPATIENT)
Dept: FAMILY MEDICINE | Facility: CLINIC | Age: 33
End: 2023-08-24
Payer: COMMERCIAL

## 2023-08-24 VITALS
SYSTOLIC BLOOD PRESSURE: 132 MMHG | DIASTOLIC BLOOD PRESSURE: 79 MMHG | RESPIRATION RATE: 18 BRPM | WEIGHT: 221.6 LBS | HEIGHT: 73 IN | HEART RATE: 56 BPM | BODY MASS INDEX: 29.37 KG/M2 | OXYGEN SATURATION: 97 % | TEMPERATURE: 96.8 F

## 2023-08-24 DIAGNOSIS — Z23 ENCOUNTER FOR IMMUNIZATION: ICD-10-CM

## 2023-08-24 DIAGNOSIS — Z00.00 ROUTINE ADULT HEALTH MAINTENANCE: Primary | ICD-10-CM

## 2023-08-24 LAB
ANION GAP SERPL CALCULATED.3IONS-SCNC: 9 MMOL/L (ref 7–15)
BUN SERPL-MCNC: 13.3 MG/DL (ref 6–20)
CALCIUM SERPL-MCNC: 9.4 MG/DL (ref 8.6–10)
CHLORIDE SERPL-SCNC: 105 MMOL/L (ref 98–107)
CHOLEST SERPL-MCNC: 206 MG/DL
CREAT SERPL-MCNC: 1.01 MG/DL (ref 0.67–1.17)
DEPRECATED HCO3 PLAS-SCNC: 26 MMOL/L (ref 22–29)
ERYTHROCYTE [DISTWIDTH] IN BLOOD BY AUTOMATED COUNT: 12.2 % (ref 10–15)
GFR SERPL CREATININE-BSD FRML MDRD: >90 ML/MIN/1.73M2
GLUCOSE SERPL-MCNC: 96 MG/DL (ref 70–99)
HCT VFR BLD AUTO: 44.3 % (ref 40–53)
HDLC SERPL-MCNC: 54 MG/DL
HGB BLD-MCNC: 15.4 G/DL (ref 13.3–17.7)
LDLC SERPL CALC-MCNC: 137 MG/DL
MCH RBC QN AUTO: 30.7 PG (ref 26.5–33)
MCHC RBC AUTO-ENTMCNC: 34.8 G/DL (ref 31.5–36.5)
MCV RBC AUTO: 88 FL (ref 78–100)
NONHDLC SERPL-MCNC: 152 MG/DL
PLATELET # BLD AUTO: 200 10E3/UL (ref 150–450)
POTASSIUM SERPL-SCNC: 4.4 MMOL/L (ref 3.4–5.3)
RBC # BLD AUTO: 5.01 10E6/UL (ref 4.4–5.9)
SODIUM SERPL-SCNC: 140 MMOL/L (ref 136–145)
TRIGL SERPL-MCNC: 74 MG/DL
WBC # BLD AUTO: 4.9 10E3/UL (ref 4–11)

## 2023-08-24 PROCEDURE — 80061 LIPID PANEL: CPT | Performed by: PHYSICIAN ASSISTANT

## 2023-08-24 PROCEDURE — 85027 COMPLETE CBC AUTOMATED: CPT | Performed by: PHYSICIAN ASSISTANT

## 2023-08-24 PROCEDURE — 90715 TDAP VACCINE 7 YRS/> IM: CPT | Performed by: PHYSICIAN ASSISTANT

## 2023-08-24 PROCEDURE — 80048 BASIC METABOLIC PNL TOTAL CA: CPT | Performed by: PHYSICIAN ASSISTANT

## 2023-08-24 PROCEDURE — 36415 COLL VENOUS BLD VENIPUNCTURE: CPT | Performed by: PHYSICIAN ASSISTANT

## 2023-08-24 PROCEDURE — 99395 PREV VISIT EST AGE 18-39: CPT | Mod: 25 | Performed by: PHYSICIAN ASSISTANT

## 2023-08-24 PROCEDURE — 90471 IMMUNIZATION ADMIN: CPT | Performed by: PHYSICIAN ASSISTANT

## 2023-08-24 ASSESSMENT — ENCOUNTER SYMPTOMS
DIZZINESS: 0
FREQUENCY: 0
COUGH: 0
MYALGIAS: 0
ARTHRALGIAS: 0
HEARTBURN: 0
DIARRHEA: 0
SHORTNESS OF BREATH: 0
PALPITATIONS: 0
EYE PAIN: 0
HEMATURIA: 0
CONSTIPATION: 0
WEAKNESS: 0
NERVOUS/ANXIOUS: 0
HEMATOCHEZIA: 0
DYSURIA: 0
HEADACHES: 0
SORE THROAT: 0
PARESTHESIAS: 0
JOINT SWELLING: 0
CHILLS: 0
NAUSEA: 0
ABDOMINAL PAIN: 0
FEVER: 0

## 2023-08-24 NOTE — NURSING NOTE
Prior to immunization administration, verified patients identity using patient s name and date of birth. Please see Immunization Activity for additional information.     Screening Questionnaire for Adult Immunization    Are you sick today?   No   Do you have allergies to medications, food, a vaccine component or latex?   No   Have you ever had a serious reaction after receiving a vaccination?   No   Do you have a long-term health problem with heart, lung, kidney, or metabolic disease (e.g., diabetes), asthma, a blood disorder, no spleen, complement component deficiency, a cochlear implant, or a spinal fluid leak?  Are you on long-term aspirin therapy?   No   Do you have cancer, leukemia, HIV/AIDS, or any other immune system problem?   No   Do you have a parent, brother, or sister with an immune system problem?   No   In the past 3 months, have you taken medications that affect  your immune system, such as prednisone, other steroids, or anticancer drugs; drugs for the treatment of rheumatoid arthritis, Crohn s disease, or psoriasis; or have you had radiation treatments?   No   Have you had a seizure, or a brain or other nervous system problem?   No   During the past year, have you received a transfusion of blood or blood    products, or been given immune (gamma) globulin or antiviral drug?   No   For women: Are you pregnant or is there a chance you could become       pregnant during the next month?   No   Have you received any vaccinations in the past 4 weeks?   No     Immunization questionnaire answers were all negative.      Patient instructed to remain in clinic for 15 minutes afterwards, and to report any adverse reactions.     Screening performed by Tanja Hutchinson MA on 8/24/2023 at 8:40 AM.

## 2023-08-24 NOTE — PROGRESS NOTES
SUBJECTIVE:   CC: Marshall is an 32 year old who presents for preventative health visit.       8/24/2023     8:16 AM   Additional Questions   Roomed by Tammi   Accompanied by self     Healthy Habits:     Getting at least 3 servings of Calcium per day:  NO    Bi-annual eye exam:  NO    Dental care twice a year:  Yes    Sleep apnea or symptoms of sleep apnea:  None    Diet:  Regular (no restrictions)    Frequency of exercise:  2-3 days/week    Duration of exercise:  30-45 minutes    Taking medications regularly:  Yes    Medication side effects:  Not applicable    Additional concerns today:  No    Marshall here today for RHM visit  He and his wife are expecting their first child (daughter) in late September  Wanting to be up to date with vaccines    Have you ever done Advance Care Planning? (For example, a Health Directive, POLST, or a discussion with a medical provider or your loved ones about your wishes): No, advance care planning information given to patient to review.  Patient declined advance care planning discussion at this time.    Social History     Tobacco Use    Smoking status: Never    Smokeless tobacco: Never   Substance Use Topics    Alcohol use: Yes             8/24/2023     8:09 AM   Alcohol Use   Prescreen: >3 drinks/day or >7 drinks/week? No          No data to display              Last PSA: No results found for: PSA    Reviewed orders with patient. Reviewed health maintenance and updated orders accordingly - Yes    Reviewed and updated as needed this visit by clinical staff   Tobacco  Allergies  Meds  Problems  Med Hx  Surg Hx  Fam Hx          Reviewed and updated as needed this visit by Provider   Tobacco   Meds  Problems  Med Hx  Surg Hx  Fam Hx           Review of Systems   Constitutional:  Negative for chills and fever.   HENT:  Negative for congestion, ear pain, hearing loss and sore throat.    Eyes:  Negative for pain and visual disturbance.   Respiratory:  Negative for cough and  "shortness of breath.    Cardiovascular:  Negative for chest pain, palpitations and peripheral edema.   Gastrointestinal:  Negative for abdominal pain, constipation, diarrhea, heartburn, hematochezia and nausea.   Genitourinary:  Negative for dysuria, frequency, genital sores, hematuria, impotence, penile discharge and urgency.   Musculoskeletal:  Negative for arthralgias, joint swelling and myalgias.   Skin:  Negative for rash.   Neurological:  Negative for dizziness, weakness, headaches and paresthesias.   Psychiatric/Behavioral:  Negative for mood changes. The patient is not nervous/anxious.        OBJECTIVE:   /79 (BP Location: Right arm, Patient Position: Sitting, Cuff Size: Adult Regular)   Pulse 56   Temp 96.8  F (36  C) (Temporal)   Resp 18   Ht 1.86 m (6' 1.23\")   Wt 100.5 kg (221 lb 9.6 oz)   SpO2 97%   BMI 29.05 kg/m      Physical Exam  GENERAL: healthy, alert and no distress  EYES: Eyes grossly normal to inspection, PERRL and conjunctivae and sclerae normal  HENT: ear canals and TM's normal, nose and mouth without ulcers or lesions  NECK: no adenopathy, no asymmetry, masses, or scars and thyroid normal to palpation  RESP: lungs clear to auscultation - no rales, rhonchi or wheezes  CV: regular rate and rhythm, normal S1 S2, no S3 or S4, no murmur, click or rub, no peripheral edema and peripheral pulses strong  ABDOMEN: soft, nontender, no hepatosplenomegaly, no masses and bowel sounds normal  MS: no gross musculoskeletal defects noted, no edema  SKIN: no suspicious lesions or rashes  NEURO: Normal strength and tone, mentation intact and speech normal  PSYCH: mentation appears normal, affect normal/bright      ASSESSMENT/PLAN:   Mikhail was seen today for physical.    Diagnoses and all orders for this visit:    Routine adult health maintenance  -     REVIEW OF HEALTH MAINTENANCE PROTOCOL ORDERS  -     CBC with platelets; Future  -     Basic metabolic panel; Future  -     Cancel: Lipid panel " "reflex to direct LDL Non-fasting; Future  -     Lipid panel reflex to direct LDL Fasting; Future    Encounter for immunization  -     TDAP 10-64Y (ADACEL,BOOSTRIX)        COUNSELING:   Reviewed preventive health counseling, as reflected in patient instructions      BMI:   Estimated body mass index is 29.05 kg/m  as calculated from the following:    Height as of this encounter: 1.86 m (6' 1.23\").    Weight as of this encounter: 100.5 kg (221 lb 9.6 oz).       He reports that he has never smoked. He has never used smokeless tobacco.            Justine Plunkett PA-C  [facility]  "

## 2023-09-12 ENCOUNTER — MYC MEDICAL ADVICE (OUTPATIENT)
Dept: FAMILY MEDICINE | Facility: CLINIC | Age: 33
End: 2023-09-12
Payer: COMMERCIAL

## 2024-07-25 ENCOUNTER — PATIENT OUTREACH (OUTPATIENT)
Dept: CARE COORDINATION | Facility: CLINIC | Age: 34
End: 2024-07-25
Payer: COMMERCIAL

## 2024-08-08 ENCOUNTER — PATIENT OUTREACH (OUTPATIENT)
Dept: CARE COORDINATION | Facility: CLINIC | Age: 34
End: 2024-08-08
Payer: COMMERCIAL

## 2024-10-27 ENCOUNTER — HEALTH MAINTENANCE LETTER (OUTPATIENT)
Age: 34
End: 2024-10-27

## 2025-01-29 ENCOUNTER — VIRTUAL VISIT (OUTPATIENT)
Dept: FAMILY MEDICINE | Facility: CLINIC | Age: 35
End: 2025-01-29
Payer: COMMERCIAL

## 2025-01-29 DIAGNOSIS — R05.1 ACUTE COUGH: ICD-10-CM

## 2025-01-29 DIAGNOSIS — J34.89 SINUS PRESSURE: Primary | ICD-10-CM

## 2025-01-29 DIAGNOSIS — H93.8X9 PRESSURE SENSATION IN EAR, UNSPECIFIED LATERALITY: ICD-10-CM

## 2025-01-29 DIAGNOSIS — R09.81 CONGESTION OF PARANASAL SINUS: ICD-10-CM

## 2025-01-29 DIAGNOSIS — R09.82 POSTNASAL DRIP: ICD-10-CM

## 2025-01-29 DIAGNOSIS — H69.90 DYSFUNCTION OF EUSTACHIAN TUBE, UNSPECIFIED LATERALITY: ICD-10-CM

## 2025-01-29 PROCEDURE — 98004 SYNCH AUDIO-VIDEO EST SF 10: CPT | Performed by: INTERNAL MEDICINE

## 2025-01-29 NOTE — PROGRESS NOTES
Marshall is a 34 year old who is being evaluated via a billable video visit.    How would you like to obtain your AVS? MyChart  If the video visit is dropped, the invitation should be resent by: Text to cell phone: [phone]  Will anyone else be joining your video visit? No      Assessment & Plan   Problem List Items Addressed This Visit    None  Visit Diagnoses       Sinus pressure    -  Primary    Pressure sensation in ear, unspecified laterality        Congestion of paranasal sinus        Postnasal drip        Acute cough        Dysfunction of Eustachian tube, unspecified laterality                 Suspect viral URI resolving,  postnasal drip sinus pressure congestion ear pressure advised on supportive treatment and use over-the-counter decongestant was antihistamine Sudafed nasal sinus rinses nasal Flonase keep well-hydrated.  Can also manually close the nostrils and lips and blood pressure to help open the eustachian tubes.  If any worsening headache or sinus symptoms spiking fever worsening cough will reassess immediately, advised patient then to seek immediate medical attention.  All questions answered, patient is not sick looking or in any distress, patient in agreement with the plan.     See Patient Instructions      Subjective   Marshall is a 34 year old, presenting for the following health issues:  URI (10 day cold with sinus pressure, ear pressure, and tight chest.)        8/24/2023     8:16 AM   Additional Questions   Roomed by Tammi   Accompanied by self     History of Present Illness       Reason for visit:  Cold  Symptom onset:  1-2 weeks ago  Symptoms include:  Cough, sorr throat, congestion, sinus pressure, ear pressure  Symptom intensity:  Moderate  Symptom progression:  Staying the same  Had these symptoms before:  Yes  Has tried/received treatment for these symptoms:  Yes  Previous treatment was successful:  No  What makes it worse:  Mornings are hard dur to overnight congestion.  What makes it  better:  Fresh air   He is taking medications regularly.     Patient presenting for evaluation of sinus congestion postnasal drip, cough, sinus pressure ear pressure onset of symptoms on 1/19.  Patient tested for COVID Monday and yesterday and they were both negative.  He had some loss of taste of 30 % with smell loss as well.  He reports some sinus pressure headaches in the morning mainly with some yellow-green phlegm production, his cough started on the 24th.  Denies any fever, he had some chills on the 26.  Denies any difficulty breathing, had some pleuritic chest symptoms on the 24-25 that resolved.  He is taking Mucinex 12-hour release.  He takes Delsym at night helps him sleep and occasional Advil.  Not known to have asthma or any chronic medical condition.  No smoking history.      Review of Systems  Constitutional, HEENT, cardiovascular, pulmonary, gi and gu systems are negative, except as otherwise noted.      Objective           Vitals:  No vitals were obtained today due to virtual visit.    Physical Exam   GENERAL: alert and no distress  EYES: Eyes grossly normal to inspection.  No discharge or erythema, or obvious scleral/conjunctival abnormalities.  RESP: No audible wheeze, cough, or visible cyanosis.    SKIN: Visible skin clear. No significant rash, abnormal pigmentation or lesions.  NEURO: Cranial nerves grossly intact.  Mentation and speech appropriate for age.  PSYCH: Appropriate affect, tone, and pace of words    Office Visit on 08/24/2023   Component Date Value Ref Range Status    WBC Count 08/24/2023 4.9  4.0 - 11.0 10e3/uL Final    RBC Count 08/24/2023 5.01  4.40 - 5.90 10e6/uL Final    Hemoglobin 08/24/2023 15.4  13.3 - 17.7 g/dL Final    Hematocrit 08/24/2023 44.3  40.0 - 53.0 % Final    MCV 08/24/2023 88  78 - 100 fL Final    MCH 08/24/2023 30.7  26.5 - 33.0 pg Final    MCHC 08/24/2023 34.8  31.5 - 36.5 g/dL Final    RDW 08/24/2023 12.2  10.0 - 15.0 % Final    Platelet Count 08/24/2023 200   150 - 450 10e3/uL Final    Sodium 08/24/2023 140  136 - 145 mmol/L Final    Potassium 08/24/2023 4.4  3.4 - 5.3 mmol/L Final    Chloride 08/24/2023 105  98 - 107 mmol/L Final    Carbon Dioxide (CO2) 08/24/2023 26  22 - 29 mmol/L Final    Anion Gap 08/24/2023 9  7 - 15 mmol/L Final    Urea Nitrogen 08/24/2023 13.3  6.0 - 20.0 mg/dL Final    Creatinine 08/24/2023 1.01  0.67 - 1.17 mg/dL Final    Calcium 08/24/2023 9.4  8.6 - 10.0 mg/dL Final    Glucose 08/24/2023 96  70 - 99 mg/dL Final    GFR Estimate 08/24/2023 >90  >60 mL/min/1.73m2 Final    Cholesterol 08/24/2023 206 (H)  <200 mg/dL Final    Triglycerides 08/24/2023 74  <150 mg/dL Final    Direct Measure HDL 08/24/2023 54  >=40 mg/dL Final    LDL Cholesterol Calculated 08/24/2023 137 (H)  <=100 mg/dL Final    Non HDL Cholesterol 08/24/2023 152 (H)  <130 mg/dL Final         Video-Visit Details    Type of service:  Video Visit   Originating Location (pt. Location): Home    Distant Location (provider location):  On-site  Platform used for Video Visit: Hesham  Signed Electronically by: Muriel Law MD